# Patient Record
Sex: FEMALE | Race: WHITE | NOT HISPANIC OR LATINO | Employment: FULL TIME | URBAN - METROPOLITAN AREA
[De-identification: names, ages, dates, MRNs, and addresses within clinical notes are randomized per-mention and may not be internally consistent; named-entity substitution may affect disease eponyms.]

---

## 2021-09-03 ENCOUNTER — LAB (OUTPATIENT)
Dept: LAB | Facility: HOSPITAL | Age: 31
End: 2021-09-03

## 2021-09-05 ENCOUNTER — APPOINTMENT (OUTPATIENT)
Dept: PREADMISSION TESTING | Facility: HOSPITAL | Age: 31
End: 2021-09-05

## 2021-09-05 LAB — SARS-COV-2 RNA PNL SPEC NAA+PROBE: NOT DETECTED

## 2021-09-05 PROCEDURE — C9803 HOPD COVID-19 SPEC COLLECT: HCPCS

## 2021-09-05 PROCEDURE — U0004 COV-19 TEST NON-CDC HGH THRU: HCPCS

## 2021-09-07 ENCOUNTER — LAB REQUISITION (OUTPATIENT)
Dept: LAB | Facility: HOSPITAL | Age: 31
End: 2021-09-07

## 2021-09-07 DIAGNOSIS — O02.1 MISSED ABORTION: ICD-10-CM

## 2021-09-07 PROCEDURE — 88305 TISSUE EXAM BY PATHOLOGIST: CPT | Performed by: OBSTETRICS & GYNECOLOGY

## 2021-09-08 LAB
CYTO UR: NORMAL
LAB AP CASE REPORT: NORMAL
LAB AP CLINICAL INFORMATION: NORMAL
PATH REPORT.FINAL DX SPEC: NORMAL
PATH REPORT.GROSS SPEC: NORMAL

## 2021-09-10 PROCEDURE — U0004 COV-19 TEST NON-CDC HGH THRU: HCPCS | Performed by: FAMILY MEDICINE

## 2022-07-08 DIAGNOSIS — Z3A.39 39 WEEKS GESTATION OF PREGNANCY: Primary | ICD-10-CM

## 2022-07-15 ENCOUNTER — HOSPITAL ENCOUNTER (INPATIENT)
Facility: HOSPITAL | Age: 32
LOS: 3 days | Discharge: HOME | End: 2022-07-18
Attending: ADVANCED PRACTICE MIDWIFE | Admitting: OBSTETRICS & GYNECOLOGY

## 2022-07-15 ENCOUNTER — ANESTHESIA (OUTPATIENT)
Dept: LABOR AND DELIVERY | Facility: HOSPITAL | Age: 32
End: 2022-07-15

## 2022-07-15 ENCOUNTER — ANESTHESIA EVENT (OUTPATIENT)
Dept: LABOR AND DELIVERY | Facility: HOSPITAL | Age: 32
End: 2022-07-15

## 2022-07-15 PROBLEM — Z37.9 NORMAL LABOR: Status: ACTIVE | Noted: 2022-07-15

## 2022-07-15 LAB
ABO GROUP BLD: NORMAL
ABO GROUP BLD: NORMAL
ALP SERPL-CCNC: 282 U/L (ref 39–117)
ALT SERPL W P-5'-P-CCNC: 9 U/L (ref 1–33)
AST SERPL-CCNC: 24 U/L (ref 1–32)
BILIRUB SERPL-MCNC: 0.2 MG/DL (ref 0–1.2)
BLD GP AB SCN SERPL QL: NEGATIVE
CREAT SERPL-MCNC: 0.74 MG/DL (ref 0.57–1)
DEPRECATED RDW RBC AUTO: 43.9 FL (ref 37–54)
ERYTHROCYTE [DISTWIDTH] IN BLOOD BY AUTOMATED COUNT: 13.4 % (ref 12.3–15.4)
HCT VFR BLD AUTO: 36.1 % (ref 34–46.6)
HGB BLD-MCNC: 12.4 G/DL (ref 12–15.9)
LDH SERPL-CCNC: 254 U/L (ref 135–214)
MCH RBC QN AUTO: 30.8 PG (ref 26.6–33)
MCHC RBC AUTO-ENTMCNC: 34.3 G/DL (ref 31.5–35.7)
MCV RBC AUTO: 89.8 FL (ref 79–97)
PLATELET # BLD AUTO: 212 10*3/MM3 (ref 140–450)
PMV BLD AUTO: 12.5 FL (ref 6–12)
RBC # BLD AUTO: 4.02 10*6/MM3 (ref 3.77–5.28)
RH BLD: NEGATIVE
RH BLD: NEGATIVE
SARS-COV-2 RDRP RESP QL NAA+PROBE: NORMAL
T&S EXPIRATION DATE: NORMAL
URATE SERPL-MCNC: 4.5 MG/DL (ref 2.4–5.7)
WBC NRBC COR # BLD: 13.99 10*3/MM3 (ref 3.4–10.8)

## 2022-07-15 PROCEDURE — 86901 BLOOD TYPING SEROLOGIC RH(D): CPT

## 2022-07-15 PROCEDURE — 82247 BILIRUBIN TOTAL: CPT | Performed by: OBSTETRICS & GYNECOLOGY

## 2022-07-15 PROCEDURE — 25010000002 ROPIVACAINE PER 1 MG: Performed by: ANESTHESIOLOGY

## 2022-07-15 PROCEDURE — 85027 COMPLETE CBC AUTOMATED: CPT | Performed by: OBSTETRICS & GYNECOLOGY

## 2022-07-15 PROCEDURE — C1755 CATHETER, INTRASPINAL: HCPCS | Performed by: ANESTHESIOLOGY

## 2022-07-15 PROCEDURE — 84075 ASSAY ALKALINE PHOSPHATASE: CPT | Performed by: OBSTETRICS & GYNECOLOGY

## 2022-07-15 PROCEDURE — 86850 RBC ANTIBODY SCREEN: CPT | Performed by: OBSTETRICS & GYNECOLOGY

## 2022-07-15 PROCEDURE — 84550 ASSAY OF BLOOD/URIC ACID: CPT | Performed by: OBSTETRICS & GYNECOLOGY

## 2022-07-15 PROCEDURE — 84450 TRANSFERASE (AST) (SGOT): CPT | Performed by: OBSTETRICS & GYNECOLOGY

## 2022-07-15 PROCEDURE — 84460 ALANINE AMINO (ALT) (SGPT): CPT | Performed by: OBSTETRICS & GYNECOLOGY

## 2022-07-15 PROCEDURE — C1755 CATHETER, INTRASPINAL: HCPCS

## 2022-07-15 PROCEDURE — 86900 BLOOD TYPING SEROLOGIC ABO: CPT | Performed by: OBSTETRICS & GYNECOLOGY

## 2022-07-15 PROCEDURE — 51703 INSERT BLADDER CATH COMPLEX: CPT

## 2022-07-15 PROCEDURE — 86901 BLOOD TYPING SEROLOGIC RH(D): CPT | Performed by: OBSTETRICS & GYNECOLOGY

## 2022-07-15 PROCEDURE — 83615 LACTATE (LD) (LDH) ENZYME: CPT | Performed by: OBSTETRICS & GYNECOLOGY

## 2022-07-15 PROCEDURE — 86900 BLOOD TYPING SEROLOGIC ABO: CPT

## 2022-07-15 PROCEDURE — 87635 SARS-COV-2 COVID-19 AMP PRB: CPT | Performed by: OBSTETRICS & GYNECOLOGY

## 2022-07-15 PROCEDURE — 25010000002 FENTANYL CITRATE (PF) 50 MCG/ML SOLUTION: Performed by: ANESTHESIOLOGY

## 2022-07-15 PROCEDURE — 59025 FETAL NON-STRESS TEST: CPT

## 2022-07-15 PROCEDURE — 82565 ASSAY OF CREATININE: CPT | Performed by: OBSTETRICS & GYNECOLOGY

## 2022-07-15 RX ORDER — ONDANSETRON 2 MG/ML
4 INJECTION INTRAMUSCULAR; INTRAVENOUS EVERY 6 HOURS PRN
Status: DISCONTINUED | OUTPATIENT
Start: 2022-07-15 | End: 2022-07-17

## 2022-07-15 RX ORDER — FAMOTIDINE 10 MG/ML
20 INJECTION, SOLUTION INTRAVENOUS ONCE AS NEEDED
Status: DISCONTINUED | OUTPATIENT
Start: 2022-07-15 | End: 2022-07-16 | Stop reason: HOSPADM

## 2022-07-15 RX ORDER — SODIUM CHLORIDE 0.9 % (FLUSH) 0.9 %
10 SYRINGE (ML) INJECTION EVERY 12 HOURS SCHEDULED
Status: DISCONTINUED | OUTPATIENT
Start: 2022-07-15 | End: 2022-07-17

## 2022-07-15 RX ORDER — MAGNESIUM CARB/ALUMINUM HYDROX 105-160MG
30 TABLET,CHEWABLE ORAL ONCE
Status: DISCONTINUED | OUTPATIENT
Start: 2022-07-15 | End: 2022-07-17

## 2022-07-15 RX ORDER — TRISODIUM CITRATE DIHYDRATE AND CITRIC ACID MONOHYDRATE 500; 334 MG/5ML; MG/5ML
30 SOLUTION ORAL ONCE
Status: DISCONTINUED | OUTPATIENT
Start: 2022-07-15 | End: 2022-07-16 | Stop reason: HOSPADM

## 2022-07-15 RX ORDER — LIDOCAINE HYDROCHLORIDE AND EPINEPHRINE 15; 5 MG/ML; UG/ML
INJECTION, SOLUTION EPIDURAL AS NEEDED
Status: DISCONTINUED | OUTPATIENT
Start: 2022-07-15 | End: 2022-07-16 | Stop reason: SURG

## 2022-07-15 RX ORDER — METOCLOPRAMIDE HYDROCHLORIDE 5 MG/ML
10 INJECTION INTRAMUSCULAR; INTRAVENOUS ONCE AS NEEDED
Status: DISCONTINUED | OUTPATIENT
Start: 2022-07-15 | End: 2022-07-16 | Stop reason: HOSPADM

## 2022-07-15 RX ORDER — SODIUM CHLORIDE 0.9 % (FLUSH) 0.9 %
10 SYRINGE (ML) INJECTION AS NEEDED
Status: DISCONTINUED | OUTPATIENT
Start: 2022-07-15 | End: 2022-07-17

## 2022-07-15 RX ORDER — ROPIVACAINE HYDROCHLORIDE 2 MG/ML
15 INJECTION, SOLUTION EPIDURAL; INFILTRATION; PERINEURAL CONTINUOUS
Status: DISCONTINUED | OUTPATIENT
Start: 2022-07-15 | End: 2022-07-17

## 2022-07-15 RX ORDER — OXYTOCIN/0.9 % SODIUM CHLORIDE 30/500 ML
85 PLASTIC BAG, INJECTION (ML) INTRAVENOUS ONCE
Status: COMPLETED | OUTPATIENT
Start: 2022-07-15 | End: 2022-07-16

## 2022-07-15 RX ORDER — MISOPROSTOL 200 UG/1
800 TABLET ORAL ONCE AS NEEDED
Status: DISCONTINUED | OUTPATIENT
Start: 2022-07-15 | End: 2022-07-17

## 2022-07-15 RX ORDER — DIPHENHYDRAMINE HYDROCHLORIDE 50 MG/ML
12.5 INJECTION INTRAMUSCULAR; INTRAVENOUS EVERY 8 HOURS PRN
Status: DISCONTINUED | OUTPATIENT
Start: 2022-07-15 | End: 2022-07-16 | Stop reason: HOSPADM

## 2022-07-15 RX ORDER — OXYTOCIN/0.9 % SODIUM CHLORIDE 30/500 ML
650 PLASTIC BAG, INJECTION (ML) INTRAVENOUS ONCE
Status: DISCONTINUED | OUTPATIENT
Start: 2022-07-15 | End: 2022-07-17

## 2022-07-15 RX ORDER — ONDANSETRON 4 MG/1
4 TABLET, FILM COATED ORAL EVERY 6 HOURS PRN
Status: DISCONTINUED | OUTPATIENT
Start: 2022-07-15 | End: 2022-07-17

## 2022-07-15 RX ORDER — FENTANYL CITRATE 50 UG/ML
INJECTION, SOLUTION INTRAMUSCULAR; INTRAVENOUS AS NEEDED
Status: DISCONTINUED | OUTPATIENT
Start: 2022-07-15 | End: 2022-07-16 | Stop reason: SURG

## 2022-07-15 RX ORDER — SODIUM CHLORIDE, SODIUM LACTATE, POTASSIUM CHLORIDE, CALCIUM CHLORIDE 600; 310; 30; 20 MG/100ML; MG/100ML; MG/100ML; MG/100ML
125 INJECTION, SOLUTION INTRAVENOUS CONTINUOUS
Status: DISCONTINUED | OUTPATIENT
Start: 2022-07-15 | End: 2022-07-17

## 2022-07-15 RX ORDER — CARBOPROST TROMETHAMINE 250 UG/ML
250 INJECTION, SOLUTION INTRAMUSCULAR
Status: DISCONTINUED | OUTPATIENT
Start: 2022-07-15 | End: 2022-07-17

## 2022-07-15 RX ORDER — PRENATAL WITH FERROUS FUM AND FOLIC ACID 3080; 920; 120; 400; 22; 1.84; 3; 20; 10; 1; 12; 200; 27; 25; 2 [IU]/1; [IU]/1; MG/1; [IU]/1; MG/1; MG/1; MG/1; MG/1; MG/1; MG/1; UG/1; MG/1; MG/1; MG/1; MG/1
1 TABLET ORAL DAILY
COMMUNITY

## 2022-07-15 RX ORDER — LIDOCAINE HYDROCHLORIDE 10 MG/ML
5 INJECTION, SOLUTION EPIDURAL; INFILTRATION; INTRACAUDAL; PERINEURAL AS NEEDED
Status: DISCONTINUED | OUTPATIENT
Start: 2022-07-15 | End: 2022-07-17

## 2022-07-15 RX ORDER — ONDANSETRON 2 MG/ML
4 INJECTION INTRAMUSCULAR; INTRAVENOUS ONCE AS NEEDED
Status: DISCONTINUED | OUTPATIENT
Start: 2022-07-15 | End: 2022-07-16 | Stop reason: HOSPADM

## 2022-07-15 RX ORDER — ROPIVACAINE HYDROCHLORIDE 5 MG/ML
INJECTION, SOLUTION EPIDURAL; INFILTRATION; PERINEURAL AS NEEDED
Status: DISCONTINUED | OUTPATIENT
Start: 2022-07-15 | End: 2022-07-16 | Stop reason: SURG

## 2022-07-15 RX ORDER — METHYLERGONOVINE MALEATE 0.2 MG/ML
200 INJECTION INTRAVENOUS ONCE AS NEEDED
Status: DISCONTINUED | OUTPATIENT
Start: 2022-07-15 | End: 2022-07-17

## 2022-07-15 RX ORDER — EPHEDRINE SULFATE 5 MG/ML
10 INJECTION INTRAVENOUS
Status: DISCONTINUED | OUTPATIENT
Start: 2022-07-15 | End: 2022-07-16 | Stop reason: HOSPADM

## 2022-07-15 RX ADMIN — LIDOCAINE HYDROCHLORIDE AND EPINEPHRINE 3 ML: 15; 5 INJECTION, SOLUTION EPIDURAL at 19:01

## 2022-07-15 RX ADMIN — LIDOCAINE HYDROCHLORIDE AND EPINEPHRINE 2 ML: 15; 5 INJECTION, SOLUTION EPIDURAL at 19:03

## 2022-07-15 RX ADMIN — ROPIVACAINE HYDROCHLORIDE 6 ML: 5 INJECTION, SOLUTION EPIDURAL; INFILTRATION; PERINEURAL at 19:05

## 2022-07-15 RX ADMIN — SODIUM CHLORIDE, POTASSIUM CHLORIDE, SODIUM LACTATE AND CALCIUM CHLORIDE 125 ML/HR: 600; 310; 30; 20 INJECTION, SOLUTION INTRAVENOUS at 19:56

## 2022-07-15 RX ADMIN — FENTANYL CITRATE 100 MCG: 50 INJECTION, SOLUTION INTRAMUSCULAR; INTRAVENOUS at 19:05

## 2022-07-15 RX ADMIN — ROPIVACAINE HYDROCHLORIDE 15 ML/HR: 2 INJECTION, SOLUTION EPIDURAL; INFILTRATION at 19:08

## 2022-07-15 RX ADMIN — SODIUM CHLORIDE, POTASSIUM CHLORIDE, SODIUM LACTATE AND CALCIUM CHLORIDE 1000 ML: 600; 310; 30; 20 INJECTION, SOLUTION INTRAVENOUS at 18:24

## 2022-07-15 NOTE — ANESTHESIA PROCEDURE NOTES
Labor Epidural      Patient reassessed immediately prior to procedure    Patient location during procedure: OB  Performed By  Anesthesiologist: Thelma Petty DO  Preanesthetic Checklist  Completed: patient identified, IV checked, risks and benefits discussed, surgical consent, monitors and equipment checked, pre-op evaluation and timeout performed  Additional Notes  CSE performed using 25g Jayy  Prep:  Pt Position:sitting  Sterile Tech:cap, gloves, mask and sterile barrier  Prep:chlorhexidine gluconate and isopropyl alcohol  Monitoring:blood pressure monitoring  Epidural Block Procedure:  Approach:midline  Guidance:palpation technique  Location:L3-L4  Needle Type:Tuohy  Needle Gauge:17 G  Loss of Resistance Medium: air  Loss of Resistance: 5cm  Cath Depth at skin:12 cm  Paresthesia: none  Aspiration:negative  Test Dose:negative  Number of Attempts: 1  Post Assessment:  Dressing:occlusive dressing applied and secured with tape  Pt Tolerance:patient tolerated the procedure well with no apparent complications  Complications:no

## 2022-07-15 NOTE — ANESTHESIA PREPROCEDURE EVALUATION
Anesthesia Evaluation     Patient summary reviewed and Nursing notes reviewed   NPO Solid Status: > 6 hours             Airway   Dental      Pulmonary - negative pulmonary ROS   Cardiovascular - negative cardio ROS        Neuro/Psych- negative ROS  GI/Hepatic/Renal/Endo    (+) obesity, morbid obesity,      Musculoskeletal (-) negative ROS    Abdominal    Substance History - negative use     OB/GYN    (+) Pregnant,         Other - negative ROS                       Anesthesia Plan    ASA 2     epidural       Anesthetic plan, risks, benefits, and alternatives have been provided, discussed and informed consent has been obtained with: patient.        CODE STATUS:

## 2022-07-15 NOTE — H&P
Sarah  Obstetric History and Physical    Chief Complaint   Patient presents with   • Contractions       Subjective     Patient is a 32 y.o. female  currently at 39w3d, who presents with contraction. On arrival she was 5-6 cm.  She is now comfortable after epidural.  Baby active.     Her prenatal care is benign.  Her previous obstetric/gynecological history is noted for previous SAB x1.    The following portions of the patients history were reviewed and updated as appropriate: current medications, allergies, past medical history, past surgical history, past family history, past social history and problem list .       Prenatal Information:            Pregnancy Outside Results - Transcribed From Office Records - See Scanned Records For Details     Test Value Date Time    ABO  O  07/15/22 184    Rh  Negative  07/15/22 1845    Antibody Screen  Negative  07/15/22 180    Varicella IgG       Rubella       Hgb  12.4 g/dL 07/15/22 1825    Hct  36.1 % 07/15/22 1825    Glucose Fasting GTT       Glucose Tolerance Test 1 hour       Glucose Tolerance Test 3 hour       Gonorrhea (discrete)       Chlamydia (discrete)       RPR       VDRL       Syphilis Antibody       HBsAg       Herpes Simplex Virus PCR       Herpes Simplex VIrus Culture       HIV       Hep C RNA Quant PCR       Hep C Antibody       AFP       Group B Strep       GBS Susceptibility to Clindamycin       GBS Susceptibility to Erythromycin       Fetal Fibronectin       Genetic Testing, Maternal Blood             Drug Screening     Test Value Date Time    Urine Drug Screen       Amphetamine Screen       Barbiturate Screen       Benzodiazepine Screen       Methadone Screen       Phencyclidine Screen       Opiates Screen       THC Screen       Cocaine Screen       Propoxyphene Screen       Buprenorphine Screen       Methamphetamine Screen       Oxycodone Screen       Tricyclic Antidepressants Screen             Legend    ^: Historical                           Past OB History:       OB History    Para Term  AB Living   2 0 0 0 1 0   SAB IAB Ectopic Molar Multiple Live Births   0 0 0 0 0 0      # Outcome Date GA Lbr Amado/2nd Weight Sex Delivery Anes PTL Lv   2 Current            1 AB 2021 8w0d              Past Medical History: Past Medical History:   Diagnosis Date   • Anxiety    • AVM (arteriovenous malformation)     right kidney   • Depression    • Kidney disorder    • Ovarian cyst       Past Surgical History Past Surgical History:   Procedure Laterality Date   • D & C WITH SUCTION     • EMBOLIZATION TUMOR      right kidney   • MOUTH SURGERY      soft tissue graft   • TONSILLECTOMY     • URETERAL STENT INSERTION        Family History: No family history on file.   Social History:  reports that she has never smoked. She has never used smokeless tobacco.   reports previous alcohol use.   reports no history of drug use.        General ROS: Pertinent items are noted in HPI, all other systems reviewed and negative    Objective     Vitals:    07/15/22 1912   BP: 119/58   Pulse: 112   Resp: 16   Temp: 97.6 °F (36.4 °C)     Weight: Weight:  [97.5 kg (215 lb)] 97.5 kg (215 lb)     Physical Examination:   General Appearance: alert, well appearing, in no apparent distress  Lungs: clear to auscultation, no wheezes, rales or rhonchi, symmetric air entry  Heart: regular rate and rhythm, no murmurs  Abdomen: Gravid uterus, soft non tender, + FM  Back exam: no CVA tenderness   Extremities: no redness or tenderness in the calves or thighs, no edema  Skin: normal coloration and turgor, no rashes      Presentation: Vertex   Cervix: Exam by: Method: sterile exam per CNM (Hernandez)   Dilation: Cervical Dilation (cm): 7-8   Effacement: Cervical Effacement: %   Station:  Membranes: Fetal Station: -1   AROM fluid clear        Fetal Heart Rate Assessment   Method: Fetal HR Assessment Method: external   Beats/min: Fetal HR (beats/min): 135   Baseline: Fetal HR Baseline:  normal range   Varibility: Fetal HR Variability: moderate (amplitude range 6 to 25 bpm)   Accels: Fetal HR Accelerations: greater than/equal to 15 bpm   Decels: Fetal HR Decelerations: absent   Tracing Category:       Uterine Assessment   Method: Method: external tocotransducer   Frequency (min): Contraction Frequency (Minutes): 4-5   Ctx Count in 10 min: Contractions in 10 Minutes: 1   Duration:     Intensity:     Intensity by IUPC:     Resting Tone:     Resting Tone by IUPC:     Allen Units:       Laboratory Results: Labs reviewed        Normal labor        Assessment:  1.  Intrauterine pregnancy at 39w3d weeks gestation with reactive, reassuring fetal status.    2.  labor  with ROM  3.  GBS status: Negative    Plan:  1. fetal and uterine monitoring  continuously, expectant management, labor augmentation  Via amniotomy and analgesia with  epidural  2. Plan of care has been reviewed with patient and partner  3.  Risks, benefits of treatment plan have been discussed.  4.  All questions have been answered.        Alissa Ng CNM  7/15/2022  19:49 EDT

## 2022-07-16 PROBLEM — Z37.9 NORMAL LABOR: Status: RESOLVED | Noted: 2022-07-15 | Resolved: 2022-07-16

## 2022-07-16 RX ORDER — PRENATAL VIT/IRON FUM/FOLIC AC 27MG-0.8MG
1 TABLET ORAL DAILY
Status: DISCONTINUED | OUTPATIENT
Start: 2022-07-16 | End: 2022-07-18 | Stop reason: HOSPADM

## 2022-07-16 RX ORDER — IBUPROFEN 600 MG/1
600 TABLET ORAL EVERY 6 HOURS PRN
Status: DISCONTINUED | OUTPATIENT
Start: 2022-07-16 | End: 2022-07-18 | Stop reason: HOSPADM

## 2022-07-16 RX ORDER — ACETAMINOPHEN 325 MG/1
650 TABLET ORAL EVERY 4 HOURS PRN
Status: DISCONTINUED | OUTPATIENT
Start: 2022-07-16 | End: 2022-07-18 | Stop reason: HOSPADM

## 2022-07-16 RX ORDER — ERYTHROMYCIN 5 MG/G
OINTMENT OPHTHALMIC
Status: DISCONTINUED
Start: 2022-07-16 | End: 2022-07-18 | Stop reason: HOSPADM

## 2022-07-16 RX ORDER — DOCUSATE SODIUM 100 MG/1
100 CAPSULE, LIQUID FILLED ORAL 2 TIMES DAILY
Status: DISCONTINUED | OUTPATIENT
Start: 2022-07-16 | End: 2022-07-18 | Stop reason: HOSPADM

## 2022-07-16 RX ADMIN — WITCH HAZEL 1 PAD: 500 SOLUTION RECTAL; TOPICAL at 06:37

## 2022-07-16 RX ADMIN — IBUPROFEN 600 MG: 600 TABLET, FILM COATED ORAL at 04:37

## 2022-07-16 RX ADMIN — Medication 85 ML/HR: at 03:36

## 2022-07-16 RX ADMIN — DOCUSATE SODIUM 100 MG: 100 CAPSULE, LIQUID FILLED ORAL at 20:22

## 2022-07-16 RX ADMIN — Medication 1 APPLICATION: at 06:37

## 2022-07-16 RX ADMIN — IBUPROFEN 600 MG: 600 TABLET, FILM COATED ORAL at 15:53

## 2022-07-16 RX ADMIN — DOCUSATE SODIUM 100 MG: 100 CAPSULE, LIQUID FILLED ORAL at 08:23

## 2022-07-16 RX ADMIN — Medication: at 06:37

## 2022-07-16 RX ADMIN — PRENATAL VITAMINS-IRON FUMARATE 27 MG IRON-FOLIC ACID 0.8 MG TABLET 1 TABLET: at 08:23

## 2022-07-16 NOTE — LACTATION NOTE
07/16/22 1000   Maternal Information   Date of Referral 07/16/22   Person Making Referral lactation consultant  (New mother/baby couplet)   Maternal Reason for Referral no prior breastfeeding experience   Maternal Assessment   Breast Size Issue none   Breast Shape Bilateral:;round   Breast Density Bilateral:;soft   Nipples Bilateral:;short   Left Nipple Symptoms intact;nontender   Right Nipple Symptoms intact;nontender   Maternal Infant Feeding   Maternal Emotional State relaxed;receptive   Infant Positioning clutch/football;cross-cradle  (Tried multiple holds, but baby can't get comfortable nursing on the left side.  He did okay at the right side, but is a little sluggish.)   Signs of Milk Transfer none noted  (Baby sucks sluggishly.)   Pain with Feeding no   Comfort Measures Before/During Feeding infant position adjusted;maternal position adjusted   Latch Assistance minimal assistance  (Full assistance needed on left side.  Mom does okay with right side.)   Support Person Involvement actively supporting mother   Milk Expression/Equipment   Breast Pump Type double electric, personal  (Mom has a Zomee Pump.)   Breast Pumping   Breast Pumping Interventions post-feed pumping encouraged

## 2022-07-16 NOTE — ANESTHESIA POSTPROCEDURE EVALUATION
Patient: Tania Weinstein Iancu    Procedure Summary     Date: 07/15/22 Room / Location:     Anesthesia Start: 1855 Anesthesia Stop: 07/16/22 0227    Procedure: LABOR ANALGESIA Diagnosis:     Scheduled Providers:  Provider: Thelma Petty DO    Anesthesia Type: epidural ASA Status: 2          Anesthesia Type: epidural    Vitals  Vitals Value Taken Time   /57 07/16/22 0915   Temp 98.3 °F (36.8 °C) 07/16/22 0915   Pulse 95 07/16/22 0915   Resp 18 07/16/22 0915   SpO2             Post Anesthesia Care and Evaluation    Patient location during evaluation: bedside  Patient participation: complete - patient participated  Level of consciousness: awake  Pain score: 0  Pain management: satisfactory to patient    Airway patency: patent  Anesthetic complications: No anesthetic complications  PONV Status: none  Cardiovascular status: acceptable and hemodynamically stable  Respiratory status: acceptable  Hydration status: acceptable  Post Neuraxial Block status: Motor and sensory function returned to baseline and No signs or symptoms of PDPH

## 2022-07-16 NOTE — PROGRESS NOTES
[facility]  Vaginal Delivery Progress Note    Subjective     Doing well, pain controlled, lochia less than menses, voiding without difficulty      Objective     Vital Signs Range for the last 24 hours  Temperature: Temp:  [97.6 °F (36.4 °C)-98.5 °F (36.9 °C)] 98.3 °F (36.8 °C)   Temp Source: Temp src: Oral   BP: BP: ()/(51-85) 129/57   Pulse: Heart Rate:  [] 95   Respirations: Resp:  [16-18] 18   SPO2:     O2 Amount (l/min):     O2 Devices           Physical Exam:  General:  no acute distress.  Abdomen: Soft, non-tender, fundus firm  Lochia: less than a normal period,  Perineum: is normal  Extremities: normal, atraumatic, no cyanosis, and trace edema.       Lab results reviewed:  Yes    Lab Results   Component Value Date    WBC 13.99 (H) 07/15/2022    HGB 12.4 07/15/2022    HCT 36.1 07/15/2022    MCV 89.8 07/15/2022     07/15/2022         Assessment & Plan       Normal spontaneous vaginal delivery      Tania Banda is Day 0  post-partum       Plan:  Continue current care.      Alissa Ng CNM  7/16/2022  13:35 EDT

## 2022-07-16 NOTE — L&D DELIVERY NOTE
[facility]  Vaginal Delivery Note   Review the Delivery Report for details.       Delivery     Delivery: Vaginal, Spontaneous     YOB: 2021    Time of Birth:  Gestational Age 2:21 AM   39w4d     Anesthesia: Epidural     Delivering clinician: Alissa Ng CNM   Forceps?   No   Vacuum? No    Shoulder dystocia present: No        Delivery narrative:  Tania pushed well to a spontaneous vaginal delivery of an viable male infant in OA position over intact perineum.  Mouth and nose bulb suctioned after delivery of head. Loose nuchal cord x 1 reduced. Shoulders and body delivered easily atraumatically. Vigorous infant placed on mothers abdomen and dried.  Cord was allowed to cease pulsation, double clamped, cut by FOB. Cord blood and cord segment obtained.  Spontaneous delivery of intact placenta with 3 vessel cord. Fundus boggy , massaged and clots evacuated until uterus firm, lochia moderate rubra.  Perineal and vaginal inspection revealed no laceration.  Mother and son stable in the immediate PP period.     Infant    Findings: male  infant     Infant observations: Weight: 3306 g (7 lb 4.6 oz)   Length: 21.25  in  Observations/Comments:        Apgars: 7  @ 1 minute /    9  @ 5 minutes   Infant Name: Kvng     Placenta, Cord, and Fluid    Placenta delivered  Spontaneous  at   7/16/2022  2:27 AM     Cord: 3 vessels  present.   Nuchal Cord?  yes; Number of nuchal loops present:  1    Cord blood obtained: Yes    Cord gases obtained:  No    Cord gas results: Venous:  No results found for: PHCVEN    Arterial:  No results found for: PHCART     Repair    Episiotomy: None     No    Lacerations: No   Estimated Blood Loss: Est. Blood Loss (mL): 300 mL (Filed from Delivery Summary) (07/16/22 0602)             Quantitative Blood Loss:                  Complications  none    Disposition  Mother to Mother Baby/Postpartum  in stable condition currently.  Baby to remains with mom  in stable condition  currently.      Alissa Ng, ROHITH  07/16/22  02:47 EDT

## 2022-07-17 LAB
BASOPHILS # BLD AUTO: 0.05 10*3/MM3 (ref 0–0.2)
BASOPHILS NFR BLD AUTO: 0.3 % (ref 0–1.5)
DEPRECATED RDW RBC AUTO: 48 FL (ref 37–54)
EOSINOPHIL # BLD AUTO: 0.17 10*3/MM3 (ref 0–0.4)
EOSINOPHIL NFR BLD AUTO: 1.2 % (ref 0.3–6.2)
ERYTHROCYTE [DISTWIDTH] IN BLOOD BY AUTOMATED COUNT: 13.8 % (ref 12.3–15.4)
HCT VFR BLD AUTO: 31.2 % (ref 34–46.6)
HGB BLD-MCNC: 10.2 G/DL (ref 12–15.9)
IMM GRANULOCYTES # BLD AUTO: 0.2 10*3/MM3 (ref 0–0.05)
IMM GRANULOCYTES NFR BLD AUTO: 1.4 % (ref 0–0.5)
LYMPHOCYTES # BLD AUTO: 2.48 10*3/MM3 (ref 0.7–3.1)
LYMPHOCYTES NFR BLD AUTO: 17.1 % (ref 19.6–45.3)
MCH RBC QN AUTO: 31.2 PG (ref 26.6–33)
MCHC RBC AUTO-ENTMCNC: 32.7 G/DL (ref 31.5–35.7)
MCV RBC AUTO: 95.4 FL (ref 79–97)
MONOCYTES # BLD AUTO: 0.98 10*3/MM3 (ref 0.1–0.9)
MONOCYTES NFR BLD AUTO: 6.8 % (ref 5–12)
NEUTROPHILS NFR BLD AUTO: 10.61 10*3/MM3 (ref 1.7–7)
NEUTROPHILS NFR BLD AUTO: 73.2 % (ref 42.7–76)
NRBC BLD AUTO-RTO: 0 /100 WBC (ref 0–0.2)
PLATELET # BLD AUTO: 204 10*3/MM3 (ref 140–450)
PMV BLD AUTO: 11.8 FL (ref 6–12)
RBC # BLD AUTO: 3.27 10*6/MM3 (ref 3.77–5.28)
WBC NRBC COR # BLD: 14.49 10*3/MM3 (ref 3.4–10.8)

## 2022-07-17 PROCEDURE — 85025 COMPLETE CBC W/AUTO DIFF WBC: CPT | Performed by: ADVANCED PRACTICE MIDWIFE

## 2022-07-17 RX ORDER — FERROUS SULFATE 325(65) MG
325 TABLET ORAL 2 TIMES DAILY WITH MEALS
Status: DISCONTINUED | OUTPATIENT
Start: 2022-07-17 | End: 2022-07-18 | Stop reason: HOSPADM

## 2022-07-17 RX ADMIN — IBUPROFEN 600 MG: 600 TABLET, FILM COATED ORAL at 23:10

## 2022-07-17 RX ADMIN — FERROUS SULFATE TAB 325 MG (65 MG ELEMENTAL FE) 325 MG: 325 (65 FE) TAB at 20:16

## 2022-07-17 RX ADMIN — DOCUSATE SODIUM 100 MG: 100 CAPSULE, LIQUID FILLED ORAL at 11:36

## 2022-07-17 RX ADMIN — DOCUSATE SODIUM 100 MG: 100 CAPSULE, LIQUID FILLED ORAL at 20:17

## 2022-07-17 RX ADMIN — BENZOCAINE 1 APPLICATION: 5.6 OINTMENT TOPICAL at 12:06

## 2022-07-17 RX ADMIN — IBUPROFEN 600 MG: 600 TABLET, FILM COATED ORAL at 14:31

## 2022-07-17 RX ADMIN — PRENATAL VITAMINS-IRON FUMARATE 27 MG IRON-FOLIC ACID 0.8 MG TABLET 1 TABLET: at 11:35

## 2022-07-17 NOTE — LACTATION NOTE
Mom states baby is very sleepy and hard to keep active at the breast while nursing.  States baby latches well with a nipple shield but will not stay active at breast for long.  Encouraged mom to pump after nursing and syringe feed at the shield.

## 2022-07-17 NOTE — PROGRESS NOTES
[facility]  Vaginal Delivery Progress Note    Subjective     Doing well, pain controlled, lochia less than menses, voiding without difficulty      Objective     Vital Signs Range for the last 24 hours  Temperature: Temp:  [97.9 °F (36.6 °C)-98.6 °F (37 °C)] 98.4 °F (36.9 °C)   Temp Source: Temp src: Oral   BP: BP: (101-120)/(62-83) 119/64   Pulse: Heart Rate:  [] 89   Respirations: Resp:  [16-18] 16   SPO2:     O2 Amount (l/min):     O2 Devices           Physical Exam:  General:  no acute distresss.  Abdomen: Soft, non-tender, fundus firm  Lochia: less than a normal period,  Perineum: is normal  Extremities: normal, atraumatic, no cyanosis, and trace edema.       Lab results reviewed:  Yes    Lab Results   Component Value Date    WBC 14.49 (H) 07/17/2022    HGB 10.2 (L) 07/17/2022    HCT 31.2 (L) 07/17/2022    MCV 95.4 07/17/2022     07/17/2022         Assessment & Plan       Normal spontaneous vaginal delivery    Postpartum anemia      Tania Weinstein Iancu is Day 1  post-partum       Plan:  Continue current care. Start ferrous sulfate 325 mg BID      Alissa Ng CNM  7/17/2022  12:40 EDT

## 2022-07-18 ENCOUNTER — APPOINTMENT (OUTPATIENT)
Dept: PREADMISSION TESTING | Facility: HOSPITAL | Age: 32
End: 2022-07-18

## 2022-07-18 VITALS
TEMPERATURE: 98.4 F | HEIGHT: 63 IN | RESPIRATION RATE: 18 BRPM | BODY MASS INDEX: 38.09 KG/M2 | SYSTOLIC BLOOD PRESSURE: 109 MMHG | DIASTOLIC BLOOD PRESSURE: 78 MMHG | WEIGHT: 215 LBS | HEART RATE: 87 BPM

## 2022-07-18 RX ORDER — IBUPROFEN 600 MG/1
600 TABLET ORAL EVERY 6 HOURS PRN
Qty: 60 TABLET | Refills: 0 | Status: SHIPPED | OUTPATIENT
Start: 2022-07-18

## 2022-07-18 RX ORDER — PSEUDOEPHEDRINE HCL 30 MG
100 TABLET ORAL 2 TIMES DAILY PRN
Qty: 60 CAPSULE | Refills: 0 | Status: SHIPPED | OUTPATIENT
Start: 2022-07-18

## 2022-07-18 RX ORDER — FERROUS SULFATE 325(65) MG
325 TABLET ORAL
Qty: 30 TABLET | Refills: 0 | Status: SHIPPED | OUTPATIENT
Start: 2022-07-18

## 2022-07-18 RX ADMIN — IBUPROFEN 600 MG: 600 TABLET, FILM COATED ORAL at 07:01

## 2022-07-18 RX ADMIN — FERROUS SULFATE TAB 325 MG (65 MG ELEMENTAL FE) 325 MG: 325 (65 FE) TAB at 10:22

## 2022-07-18 RX ADMIN — DOCUSATE SODIUM 100 MG: 100 CAPSULE, LIQUID FILLED ORAL at 10:22

## 2022-07-18 RX ADMIN — PRENATAL VITAMINS-IRON FUMARATE 27 MG IRON-FOLIC ACID 0.8 MG TABLET 1 TABLET: at 10:22

## 2022-07-18 RX ADMIN — WITCH HAZEL 1 PAD: 500 SOLUTION RECTAL; TOPICAL at 16:13

## 2022-07-18 NOTE — PLAN OF CARE
Goal Outcome Evaluation:  Plan of Care Reviewed With: patient, spouse  D/C home today, following up with MARILU Dudley CNM, VSS, pt providing all self care        Progress: improving

## 2022-07-18 NOTE — DISCHARGE SUMMARY
[facility]  Delivery Discharge Summary    Primary OB Clinician:     EDC: Estimated Date of Delivery: 22    Gestational Age:39w4d    Date of Admission: 7/15/2022    Admission Diagnosis:      Discharge Diagnosis: Same    Date of Delivery: 2022   Time of Delivery: 2:21 AM     Delivered By:  Alissa Ng     Delivery Type: Vaginal, Spontaneous          Baby:male  infant;   Apgar:  7  @ 1 minute /   Apgar:  9  @ 5 minutes   Weight: 3306 g (7 lb 4.6 oz)    Length: 21.25     Anesthesia: Epidural      Laceration: No    Exam:  Normal postpartum exam    Hospital Course:  Hospital course has been stable.  Patient is tolerating po, voiding without difficulty and ready for discharge.  Please see medication reconciliation and lab report for additional details.      Follow Up:  Patient has been given a follow up appointment in our office.     Discharge Date: 2022; Discharge Time: 12:26 EDT    Meagan Mays CNM   12:26 EDT   22

## 2022-07-18 NOTE — PROGRESS NOTES
[facility]  Vaginal Delivery Progress Note    Subjective     PPD#2 . Feeling well. Tolerating regular diet. Voiding without difficulty. Pain controlled. Decreasing lochia. VSS. Afebrile. Mild pp anemia. Ready for discharge home.       Objective     Vital Signs Range for the last 24 hours  Temperature: Temp:  [98 °F (36.7 °C)-98.4 °F (36.9 °C)] 98.4 °F (36.9 °C)   Temp Source: Temp src: Oral   BP: BP: (104-110)/(61-78) 109/78   Pulse: Heart Rate:  [78-87] 87   Respirations: Resp:  [16-18] 18   SPO2:     O2 Amount (l/min):     O2 Devices           Physical Exam:  General:  no acute distresss.  Abdomen: Soft, non-tender, fundus firm  Extremities: normal, atraumatic, no cyanosis, and trace edema.       Lab results reviewed:  Yes    Lab Results   Component Value Date    WBC 14.49 (H) 2022    HGB 10.2 (L) 2022    HCT 31.2 (L) 2022    MCV 95.4 2022     2022         Assessment & Plan       Normal spontaneous vaginal delivery    Postpartum anemia      Tania Kayharman Iancu is Day 2  post-partum       Plan:  Discharge home with standard precautions and return to clinic in 4-6 weeks.      Meagan Mays CNM  2022  12:23 EDT

## 2022-07-19 NOTE — LACTATION NOTE
Questions for family were answered prior to discharge. PRN Lactation Consultant/Clinic contact encouraged.   07/18/22 1130   Maternal Information   Date of Referral 07/18/22   Person Making Referral lactation consultant  (follow up prior to discharge)   Maternal Reason for Referral no prior breastfeeding experience   Infant Reason for Referral hypoglycemia   Maternal Assessment   Breast Size Issue none   Breast Shape Bilateral:;round   Breast Density Bilateral:;soft   Nipples Bilateral:;short   Left Nipple Symptoms tender   Right Nipple Symptoms tender   Maternal Infant Feeding   Maternal Emotional State receptive;anxious   Infant Positioning cross-cradle  (left)   Signs of Milk Transfer other (see comments)  (few suckles, breastfeeding attempt as education for mother, after 30ml formula by bottle)   Pain with Feeding no  (per pt report)   Comfort Measures Before/During Feeding infant position adjusted;latch adjusted;maternal position adjusted;suction broken using finger  (Small nipple shield with proper placement education/demonstrated completed)   Comfort Measures Following Feeding other (see comments);breast cream/oil applied  (soft shells given)   Latch Assistance minimal assistance   Support Person Involvement actively supporting mother   Additional Documentation Breastfeeding Supplementation (Group)   Breastfeeding Supplementation   Person Feeding Infant father   Infant Indication for Supplementation hypoglycemia   Breastfeeding Supplementation Type expressed breast milk;formula   Method of Supplementation syringe;paced bottle  (education completed and father demonstrated)   Nipple Used For Supplementation extra slow flow  (Dr Humphreys's transition nipple provided)   Milk Expression/Equipment   Breast Pump Type double electric, personal  (zomee pump at bedside)   Breast Pumping   Breast Pumping Interventions post-feed pumping encouraged  (for short/missed feedings, if supplementation is required, or if  breastfeeding becomes too painful to encourage breastmilk production)

## 2023-07-27 LAB
EXTERNAL CHLAMYDIA SCREEN: NEGATIVE
EXTERNAL GONORRHEA SCREEN: NEGATIVE

## 2023-11-16 LAB
EXTERNAL ANTIBODY SCREEN: NEGATIVE
EXTERNAL GLUCOSE TOLERANCE TEST FASTING: 110 MG/DL

## 2023-12-10 ENCOUNTER — HOSPITAL ENCOUNTER (OUTPATIENT)
Facility: HOSPITAL | Age: 33
Discharge: HOME | End: 2023-12-10
Attending: ADVANCED PRACTICE MIDWIFE | Admitting: OBSTETRICS & GYNECOLOGY
Payer: COMMERCIAL

## 2023-12-10 VITALS
BODY MASS INDEX: 37.21 KG/M2 | TEMPERATURE: 97.6 F | DIASTOLIC BLOOD PRESSURE: 71 MMHG | SYSTOLIC BLOOD PRESSURE: 104 MMHG | HEART RATE: 98 BPM | HEIGHT: 63 IN | WEIGHT: 210 LBS | OXYGEN SATURATION: 95 % | RESPIRATION RATE: 18 BRPM

## 2023-12-10 PROBLEM — O36.8131 DECREASED FETAL MOVEMENT, THIRD TRIMESTER, FETUS 1: Status: ACTIVE | Noted: 2023-12-10

## 2023-12-10 PROBLEM — Z34.83 PRENATAL CARE, SUBSEQUENT PREGNANCY IN THIRD TRIMESTER: Status: ACTIVE | Noted: 2023-12-10

## 2023-12-10 PROBLEM — U07.1 COVID-19 AFFECTING PREGNANCY IN SECOND TRIMESTER: Status: ACTIVE | Noted: 2023-10-01

## 2023-12-10 PROBLEM — O98.512 COVID-19 AFFECTING PREGNANCY IN SECOND TRIMESTER: Status: ACTIVE | Noted: 2023-10-01

## 2023-12-10 PROCEDURE — 59025 FETAL NON-STRESS TEST: CPT | Performed by: OBSTETRICS & GYNECOLOGY

## 2023-12-10 PROCEDURE — 59025 FETAL NON-STRESS TEST: CPT

## 2023-12-10 PROCEDURE — 99214 OFFICE O/P EST MOD 30 MIN: CPT | Performed by: OBSTETRICS & GYNECOLOGY

## 2023-12-10 PROCEDURE — G0463 HOSPITAL OUTPT CLINIC VISIT: HCPCS

## 2023-12-10 RX ORDER — ASPIRIN 81 MG/1
81 TABLET ORAL DAILY
COMMUNITY

## 2023-12-10 NOTE — H&P
" [facility]  Tania Banda  : 1990  MRN: [id number]  CSN: [id number]    History and Physical    Subjective   Chief Complaint   Patient presents with    Decreased Fetal Movement     Tania Banda is a 33 y.o. year old  currently at 31w1d presenting with complaints of decreased fetal movement for the last day.  Since arrival fetal movement has picked up.  She is also had some concerns about food poisoning but is not having significant emesis or diarrhea.    Prenatal care has been with [facility].  It has been benign.  She did have COVID in the second trimester and has resolved fully.    OB History    Para Term  AB Living   3 1 1 0 1 1   SAB IAB Ectopic Molar Multiple Live Births   1 0 0 0 0 1      # Outcome Date GA Lbr Amado/2nd Weight Sex Delivery Anes PTL Lv   3 Current            2 Term 22 39w4d  3306 g (7 lb 4.6 oz) M Vag-Spont EPI N BORIS      Name: Kvng \"Cyril\"      Apgar1: 7  Apgar5: 9   1 SAB 2021 8w0d   M         Past Medical History:   Diagnosis Date    Anxiety and depression     AVM (arteriovenous malformation) of right kidney 2010    COVID-19 affecting pregnancy in second trimester 10/2023     Past Surgical History:   Procedure Laterality Date    WISDOM TOOTH EXTRACTION  2007    URETERAL STENT INSERTION Right 2009    EMBOLIZATION Right 2010    Right kidney AVM    EMBOLIZATION Right 2011    Right kidney AVM    TONSILLECTOMY  2016    GUM SURGERY  2017    D & C WITH SUCTION  2021     No current facility-administered medications for this encounter.    Allergies   Allergen Reactions    Sulfa Antibiotics Swelling     Severe facial swelling and rash     Social History    Tobacco Use      Smoking status: Never      Smokeless tobacco: Never    Review of Systems   Constitutional:  Negative for unexpected weight change.   Respiratory:  Negative for cough and shortness of breath.    Cardiovascular:  Negative for chest pain.   Gastrointestinal:  Negative for " "abdominal distention, constipation and diarrhea.        No persistent bloating   Genitourinary:  Negative for difficulty urinating, dysuria, hematuria and urgency.        No significant incontinence   Skin:  Negative for rash.   Neurological:  Negative for headaches.         Objective   /71   Pulse 98   Temp 97.6 °F (36.4 °C) (Oral)   Resp 18   Ht 160 cm (63\")   Wt 95.3 kg (210 lb)   SpO2 95%   BMI 37.20 kg/m²   General: well developed; well nourished  no acute distress   Abdomen: soft, non-tender; no masses  no umbilical or inguinal hernias are present  no hepato-splenomegaly   FHT's: reassuring and category 1      Cervix: was not checked.   Contractions: none   Back: CVA tenderness is absent bilateral      Prenatal Labs  N/a    Current Labs Reviewed   none       Assessment   IUP at 31w1d  Subjective decreased fetal movement with reactive nonstress test     Plan   Okay for discharge to home with scheduled follow-up with Marisela Chahal MD  12/10/2023  09:16 EST         Non Stress Test     [place]  Patient: Tania Banda  : 1990  MRN: [id number]  CSN: [id number]  Date: 12/10/2023    Estimated Date of Delivery: 2/10/24  Gestational Age: 31w1d    Indication for NST decreased fetal movement       Total Time on NST > 20 minutes       Interpretation    Baseline  beats per minute   Category 1   Decelerations Absent       Additional Comments none       This note has been electronically signed.    Ollie Chahal MD  12/10/2023  09:16 EST  "

## 2024-01-11 LAB — EXTERNAL GROUP B STREP ANTIGEN: NEGATIVE

## 2024-02-03 ENCOUNTER — ANESTHESIA EVENT (OUTPATIENT)
Dept: LABOR AND DELIVERY | Facility: HOSPITAL | Age: 34
End: 2024-02-03
Payer: COMMERCIAL

## 2024-02-03 ENCOUNTER — ANESTHESIA (OUTPATIENT)
Dept: LABOR AND DELIVERY | Facility: HOSPITAL | Age: 34
End: 2024-02-03
Payer: COMMERCIAL

## 2024-02-03 ENCOUNTER — HOSPITAL ENCOUNTER (INPATIENT)
Facility: HOSPITAL | Age: 34
LOS: 2 days | Discharge: HOME | End: 2024-02-05
Attending: ADVANCED PRACTICE MIDWIFE | Admitting: OBSTETRICS & GYNECOLOGY
Payer: COMMERCIAL

## 2024-02-03 PROBLEM — Z37.9 NORMAL LABOR: Status: ACTIVE | Noted: 2024-02-03

## 2024-02-03 PROBLEM — Z37.9 NORMAL LABOR: Status: RESOLVED | Noted: 2024-02-03 | Resolved: 2024-02-03

## 2024-02-03 LAB
ABO GROUP BLD: NORMAL
BLD GP AB SCN SERPL QL: POSITIVE
DEPRECATED RDW RBC AUTO: 44.8 FL (ref 37–54)
ERYTHROCYTE [DISTWIDTH] IN BLOOD BY AUTOMATED COUNT: 13 % (ref 12.3–15.4)
HCT VFR BLD AUTO: 35.2 % (ref 34–46.6)
HGB BLD-MCNC: 12.3 G/DL (ref 12–15.9)
MCH RBC QN AUTO: 33.1 PG (ref 26.6–33)
MCHC RBC AUTO-ENTMCNC: 34.9 G/DL (ref 31.5–35.7)
MCV RBC AUTO: 94.6 FL (ref 79–97)
PLATELET # BLD AUTO: 204 10*3/MM3 (ref 140–450)
PMV BLD AUTO: 11.7 FL (ref 6–12)
RBC # BLD AUTO: 3.72 10*6/MM3 (ref 3.77–5.28)
RESIDUAL RHIG DETECTED: NORMAL
RH BLD: NEGATIVE
T PALLIDUM IGG SER QL: NORMAL
T&S EXPIRATION DATE: NORMAL
WBC NRBC COR # BLD AUTO: 13.3 10*3/MM3 (ref 3.4–10.8)

## 2024-02-03 PROCEDURE — 25810000003 LACTATED RINGERS SOLUTION: Performed by: OBSTETRICS & GYNECOLOGY

## 2024-02-03 PROCEDURE — 86850 RBC ANTIBODY SCREEN: CPT | Performed by: OBSTETRICS & GYNECOLOGY

## 2024-02-03 PROCEDURE — 85027 COMPLETE CBC AUTOMATED: CPT | Performed by: OBSTETRICS & GYNECOLOGY

## 2024-02-03 PROCEDURE — C1755 CATHETER, INTRASPINAL: HCPCS | Performed by: ANESTHESIOLOGY

## 2024-02-03 PROCEDURE — S0260 H&P FOR SURGERY: HCPCS | Performed by: OBSTETRICS & GYNECOLOGY

## 2024-02-03 PROCEDURE — C1755 CATHETER, INTRASPINAL: HCPCS

## 2024-02-03 PROCEDURE — 51703 INSERT BLADDER CATH COMPLEX: CPT

## 2024-02-03 PROCEDURE — 25010000002 ROPIVACAINE PER 1 MG: Performed by: ANESTHESIOLOGY

## 2024-02-03 PROCEDURE — 59025 FETAL NON-STRESS TEST: CPT

## 2024-02-03 PROCEDURE — 86900 BLOOD TYPING SEROLOGIC ABO: CPT | Performed by: OBSTETRICS & GYNECOLOGY

## 2024-02-03 PROCEDURE — 86780 TREPONEMA PALLIDUM: CPT | Performed by: OBSTETRICS & GYNECOLOGY

## 2024-02-03 PROCEDURE — 10907ZC DRAINAGE OF AMNIOTIC FLUID, THERAPEUTIC FROM PRODUCTS OF CONCEPTION, VIA NATURAL OR ARTIFICIAL OPENING: ICD-10-PCS | Performed by: OBSTETRICS & GYNECOLOGY

## 2024-02-03 PROCEDURE — 0UQMXZZ REPAIR VULVA, EXTERNAL APPROACH: ICD-10-PCS | Performed by: OBSTETRICS & GYNECOLOGY

## 2024-02-03 PROCEDURE — 25010000002 FENTANYL CITRATE (PF) 50 MCG/ML SOLUTION: Performed by: ANESTHESIOLOGY

## 2024-02-03 PROCEDURE — 86870 RBC ANTIBODY IDENTIFICATION: CPT | Performed by: OBSTETRICS & GYNECOLOGY

## 2024-02-03 PROCEDURE — 86901 BLOOD TYPING SEROLOGIC RH(D): CPT | Performed by: OBSTETRICS & GYNECOLOGY

## 2024-02-03 RX ORDER — OXYTOCIN/0.9 % SODIUM CHLORIDE 30/500 ML
PLASTIC BAG, INJECTION (ML) INTRAVENOUS
Status: DISCONTINUED
Start: 2024-02-03 | End: 2024-02-05 | Stop reason: HOSPADM

## 2024-02-03 RX ORDER — OXYTOCIN/0.9 % SODIUM CHLORIDE 30/500 ML
999 PLASTIC BAG, INJECTION (ML) INTRAVENOUS ONCE
Status: COMPLETED | OUTPATIENT
Start: 2024-02-03 | End: 2024-02-03

## 2024-02-03 RX ORDER — SODIUM CHLORIDE 0.9 % (FLUSH) 0.9 %
1-10 SYRINGE (ML) INJECTION AS NEEDED
Status: DISCONTINUED | OUTPATIENT
Start: 2024-02-03 | End: 2024-02-05 | Stop reason: HOSPADM

## 2024-02-03 RX ORDER — SODIUM CHLORIDE 9 MG/ML
40 INJECTION, SOLUTION INTRAVENOUS AS NEEDED
Status: DISCONTINUED | OUTPATIENT
Start: 2024-02-03 | End: 2024-02-03 | Stop reason: HOSPADM

## 2024-02-03 RX ORDER — OXYTOCIN/0.9 % SODIUM CHLORIDE 30/500 ML
125 PLASTIC BAG, INJECTION (ML) INTRAVENOUS CONTINUOUS PRN
Status: DISCONTINUED | OUTPATIENT
Start: 2024-02-03 | End: 2024-02-05 | Stop reason: HOSPADM

## 2024-02-03 RX ORDER — HYDROCODONE BITARTRATE AND ACETAMINOPHEN 10; 325 MG/1; MG/1
1 TABLET ORAL EVERY 4 HOURS PRN
Status: DISCONTINUED | OUTPATIENT
Start: 2024-02-03 | End: 2024-02-05 | Stop reason: HOSPADM

## 2024-02-03 RX ORDER — ROPIVACAINE HYDROCHLORIDE 5 MG/ML
INJECTION, SOLUTION EPIDURAL; INFILTRATION; PERINEURAL AS NEEDED
Status: DISCONTINUED | OUTPATIENT
Start: 2024-02-03 | End: 2024-02-03 | Stop reason: SURG

## 2024-02-03 RX ORDER — FENTANYL CITRATE 50 UG/ML
INJECTION, SOLUTION INTRAMUSCULAR; INTRAVENOUS AS NEEDED
Status: DISCONTINUED | OUTPATIENT
Start: 2024-02-03 | End: 2024-02-03 | Stop reason: SURG

## 2024-02-03 RX ORDER — HYDROCORTISONE 25 MG/G
1 CREAM TOPICAL AS NEEDED
Status: DISCONTINUED | OUTPATIENT
Start: 2024-02-03 | End: 2024-02-05 | Stop reason: HOSPADM

## 2024-02-03 RX ORDER — METOCLOPRAMIDE HYDROCHLORIDE 5 MG/ML
10 INJECTION INTRAMUSCULAR; INTRAVENOUS ONCE AS NEEDED
Status: DISCONTINUED | OUTPATIENT
Start: 2024-02-03 | End: 2024-02-03 | Stop reason: HOSPADM

## 2024-02-03 RX ORDER — METHYLERGONOVINE MALEATE 0.2 MG/ML
200 INJECTION INTRAVENOUS ONCE AS NEEDED
Status: DISCONTINUED | OUTPATIENT
Start: 2024-02-03 | End: 2024-02-03 | Stop reason: HOSPADM

## 2024-02-03 RX ORDER — POLYETHYLENE GLYCOL 3350 17 G/17G
17 POWDER, FOR SOLUTION ORAL DAILY PRN
Status: DISCONTINUED | OUTPATIENT
Start: 2024-02-03 | End: 2024-02-05 | Stop reason: HOSPADM

## 2024-02-03 RX ORDER — SIMETHICONE 80 MG
80 TABLET,CHEWABLE ORAL 4 TIMES DAILY PRN
Status: DISCONTINUED | OUTPATIENT
Start: 2024-02-03 | End: 2024-02-05 | Stop reason: HOSPADM

## 2024-02-03 RX ORDER — OXYTOCIN/0.9 % SODIUM CHLORIDE 30/500 ML
250 PLASTIC BAG, INJECTION (ML) INTRAVENOUS CONTINUOUS
Status: DISPENSED | OUTPATIENT
Start: 2024-02-03 | End: 2024-02-03

## 2024-02-03 RX ORDER — FAMOTIDINE 10 MG/ML
20 INJECTION, SOLUTION INTRAVENOUS ONCE AS NEEDED
Status: DISCONTINUED | OUTPATIENT
Start: 2024-02-03 | End: 2024-02-03 | Stop reason: HOSPADM

## 2024-02-03 RX ORDER — EPHEDRINE SULFATE 5 MG/ML
INJECTION INTRAVENOUS
Status: DISCONTINUED
Start: 2024-02-03 | End: 2024-02-03 | Stop reason: WASHOUT

## 2024-02-03 RX ORDER — ACETAMINOPHEN 325 MG/1
650 TABLET ORAL EVERY 6 HOURS PRN
Status: DISCONTINUED | OUTPATIENT
Start: 2024-02-03 | End: 2024-02-05 | Stop reason: HOSPADM

## 2024-02-03 RX ORDER — LIDOCAINE HYDROCHLORIDE 10 MG/ML
0.5 INJECTION, SOLUTION EPIDURAL; INFILTRATION; INTRACAUDAL; PERINEURAL ONCE AS NEEDED
Status: DISCONTINUED | OUTPATIENT
Start: 2024-02-03 | End: 2024-02-03 | Stop reason: HOSPADM

## 2024-02-03 RX ORDER — CARBOPROST TROMETHAMINE 250 UG/ML
250 INJECTION, SOLUTION INTRAMUSCULAR AS NEEDED
Status: DISCONTINUED | OUTPATIENT
Start: 2024-02-03 | End: 2024-02-03 | Stop reason: HOSPADM

## 2024-02-03 RX ORDER — PRENATAL VIT/IRON FUM/FOLIC AC 27MG-0.8MG
1 TABLET ORAL DAILY
Status: DISCONTINUED | OUTPATIENT
Start: 2024-02-04 | End: 2024-02-05 | Stop reason: HOSPADM

## 2024-02-03 RX ORDER — SODIUM CHLORIDE, SODIUM LACTATE, POTASSIUM CHLORIDE, CALCIUM CHLORIDE 600; 310; 30; 20 MG/100ML; MG/100ML; MG/100ML; MG/100ML
125 INJECTION, SOLUTION INTRAVENOUS CONTINUOUS
Status: DISCONTINUED | OUTPATIENT
Start: 2024-02-03 | End: 2024-02-03

## 2024-02-03 RX ORDER — ACETAMINOPHEN 325 MG/1
650 TABLET ORAL EVERY 4 HOURS PRN
Status: DISCONTINUED | OUTPATIENT
Start: 2024-02-03 | End: 2024-02-03 | Stop reason: HOSPADM

## 2024-02-03 RX ORDER — EPHEDRINE SULFATE 5 MG/ML
10 INJECTION INTRAVENOUS
Status: DISCONTINUED | OUTPATIENT
Start: 2024-02-03 | End: 2024-02-03 | Stop reason: HOSPADM

## 2024-02-03 RX ORDER — ONDANSETRON 2 MG/ML
4 INJECTION INTRAMUSCULAR; INTRAVENOUS EVERY 6 HOURS PRN
Status: DISCONTINUED | OUTPATIENT
Start: 2024-02-03 | End: 2024-02-05 | Stop reason: HOSPADM

## 2024-02-03 RX ORDER — MAGNESIUM CARB/ALUMINUM HYDROX 105-160MG
30 TABLET,CHEWABLE ORAL ONCE
Status: DISCONTINUED | OUTPATIENT
Start: 2024-02-03 | End: 2024-02-03 | Stop reason: HOSPADM

## 2024-02-03 RX ORDER — IBUPROFEN 600 MG/1
600 TABLET ORAL EVERY 6 HOURS PRN
Status: DISCONTINUED | OUTPATIENT
Start: 2024-02-03 | End: 2024-02-05 | Stop reason: HOSPADM

## 2024-02-03 RX ORDER — MISOPROSTOL 200 UG/1
800 TABLET ORAL AS NEEDED
Status: DISCONTINUED | OUTPATIENT
Start: 2024-02-03 | End: 2024-02-03 | Stop reason: HOSPADM

## 2024-02-03 RX ORDER — CITRIC ACID/SODIUM CITRATE 334-500MG
30 SOLUTION, ORAL ORAL ONCE
Status: DISCONTINUED | OUTPATIENT
Start: 2024-02-03 | End: 2024-02-03 | Stop reason: HOSPADM

## 2024-02-03 RX ORDER — DOCUSATE SODIUM 100 MG/1
100 CAPSULE, LIQUID FILLED ORAL 2 TIMES DAILY
Status: DISCONTINUED | OUTPATIENT
Start: 2024-02-04 | End: 2024-02-05 | Stop reason: HOSPADM

## 2024-02-03 RX ORDER — DIPHENHYDRAMINE HYDROCHLORIDE 50 MG/ML
12.5 INJECTION INTRAMUSCULAR; INTRAVENOUS EVERY 8 HOURS PRN
Status: DISCONTINUED | OUTPATIENT
Start: 2024-02-03 | End: 2024-02-03 | Stop reason: HOSPADM

## 2024-02-03 RX ORDER — ONDANSETRON 2 MG/ML
4 INJECTION INTRAMUSCULAR; INTRAVENOUS ONCE AS NEEDED
Status: DISCONTINUED | OUTPATIENT
Start: 2024-02-03 | End: 2024-02-03 | Stop reason: HOSPADM

## 2024-02-03 RX ORDER — ONDANSETRON 4 MG/1
4 TABLET, ORALLY DISINTEGRATING ORAL EVERY 6 HOURS PRN
Status: DISCONTINUED | OUTPATIENT
Start: 2024-02-03 | End: 2024-02-05 | Stop reason: HOSPADM

## 2024-02-03 RX ORDER — SODIUM CHLORIDE 0.9 % (FLUSH) 0.9 %
10 SYRINGE (ML) INJECTION EVERY 12 HOURS SCHEDULED
Status: DISCONTINUED | OUTPATIENT
Start: 2024-02-03 | End: 2024-02-03 | Stop reason: HOSPADM

## 2024-02-03 RX ORDER — LIDOCAINE HYDROCHLORIDE AND EPINEPHRINE 15; 5 MG/ML; UG/ML
INJECTION, SOLUTION EPIDURAL AS NEEDED
Status: DISCONTINUED | OUTPATIENT
Start: 2024-02-03 | End: 2024-02-03 | Stop reason: SURG

## 2024-02-03 RX ORDER — BISACODYL 10 MG
10 SUPPOSITORY, RECTAL RECTAL DAILY PRN
Status: DISCONTINUED | OUTPATIENT
Start: 2024-02-04 | End: 2024-02-05 | Stop reason: HOSPADM

## 2024-02-03 RX ORDER — ROPIVACAINE HYDROCHLORIDE 2 MG/ML
15 INJECTION, SOLUTION EPIDURAL; INFILTRATION; PERINEURAL CONTINUOUS
Status: DISCONTINUED | OUTPATIENT
Start: 2024-02-03 | End: 2024-02-03

## 2024-02-03 RX ORDER — SODIUM CHLORIDE 0.9 % (FLUSH) 0.9 %
10 SYRINGE (ML) INJECTION AS NEEDED
Status: DISCONTINUED | OUTPATIENT
Start: 2024-02-03 | End: 2024-02-03 | Stop reason: HOSPADM

## 2024-02-03 RX ORDER — HYDROCODONE BITARTRATE AND ACETAMINOPHEN 5; 325 MG/1; MG/1
1 TABLET ORAL EVERY 4 HOURS PRN
Status: DISCONTINUED | OUTPATIENT
Start: 2024-02-03 | End: 2024-02-05 | Stop reason: HOSPADM

## 2024-02-03 RX ADMIN — LIDOCAINE HYDROCHLORIDE AND EPINEPHRINE 2 ML: 15; 5 INJECTION, SOLUTION EPIDURAL at 16:12

## 2024-02-03 RX ADMIN — SODIUM CHLORIDE, POTASSIUM CHLORIDE, SODIUM LACTATE AND CALCIUM CHLORIDE 1000 ML: 600; 310; 30; 20 INJECTION, SOLUTION INTRAVENOUS at 15:51

## 2024-02-03 RX ADMIN — Medication: at 23:53

## 2024-02-03 RX ADMIN — SODIUM CHLORIDE, POTASSIUM CHLORIDE, SODIUM LACTATE AND CALCIUM CHLORIDE 1500 ML: 600; 310; 30; 20 INJECTION, SOLUTION INTRAVENOUS at 15:26

## 2024-02-03 RX ADMIN — ROPIVACAINE HYDROCHLORIDE 15 ML/HR: 2 INJECTION, SOLUTION EPIDURAL; INFILTRATION at 16:17

## 2024-02-03 RX ADMIN — WITCH HAZEL: 500 SOLUTION RECTAL; TOPICAL at 23:53

## 2024-02-03 RX ADMIN — FENTANYL CITRATE 100 MCG: 50 INJECTION, SOLUTION INTRAMUSCULAR; INTRAVENOUS at 16:15

## 2024-02-03 RX ADMIN — ROPIVACAINE HYDROCHLORIDE 7 ML: 5 INJECTION, SOLUTION EPIDURAL; INFILTRATION; PERINEURAL at 16:15

## 2024-02-03 RX ADMIN — LIDOCAINE HYDROCHLORIDE AND EPINEPHRINE 3 ML: 15; 5 INJECTION, SOLUTION EPIDURAL at 16:09

## 2024-02-03 RX ADMIN — Medication 1 APPLICATION: at 23:53

## 2024-02-03 RX ADMIN — BENZOCAINE 1 APPLICATION: 5.6 OINTMENT TOPICAL at 23:53

## 2024-02-03 RX ADMIN — Medication 250 ML/HR: at 20:46

## 2024-02-03 RX ADMIN — Medication 999 ML/HR: at 20:09

## 2024-02-03 NOTE — H&P
History and Physical     Name: Tania Banda    : 1990     MRN: [id number]       Chief Complaint:     Contractions     Subjective     History of Present Illness:  Tania Banda is a 33 y.o.  39w0d who presents today with contractions.   No LOF   Uncomplicated prenatal course   GBS neg     ADELINE: Estimated Date of Delivery: 2/10/24     ROS:   As noted in HPI.     Past Medical History:   Diagnosis Date    Anxiety and depression     AVM (arteriovenous malformation) of right kidney     COVID-19 affecting pregnancy in second trimester 10/2023      Past Surgical History:   Procedure Laterality Date    D & C WITH SUCTION  2021    EMBOLIZATION Right 2010    Right kidney AVM    EMBOLIZATION Right 2011    Right kidney AVM    GUM SURGERY  2017    TONSILLECTOMY  2016    URETERAL STENT INSERTION Right 2009    WISDOM TOOTH EXTRACTION  2007      History reviewed. No pertinent family history.   OB History          3    Para   1    Term   1            AB   1    Living   1         SAB   1    IAB        Ectopic        Molar        Multiple   0    Live Births   1              Prior to Admission medications    Medication Sig Start Date End Date Taking? Authorizing Provider   aspirin 81 MG EC tablet Take 1 tablet by mouth Daily.   Yes Yvette Morris MD   docusate sodium 100 MG capsule Take 1 capsule by mouth 2 (Two) Times a Day As Needed for Constipation. 22  Yes Meagan Mays CNM   Polyethylene Glycol 3350 (MIRALAX PO) Take  by mouth.   Yes Yvette Morris MD   Prenatal Vit-Fe Fumarate-FA (Prenatal 27-1) 27-1 MG tablet tablet Take 1 tablet by mouth Daily.   Yes Yvette Morris MD        Current Facility-Administered Medications:     acetaminophen (TYLENOL) tablet 650 mg, 650 mg, Oral, Q4H PRN, Tawanna Padilla MD    lactated ringers bolus 1,500 mL, 1,500 mL, Intravenous, Once, Tawanna Padilla MD    lactated ringers infusion, 125 mL/hr,  "Intravenous, Continuous, Tawanna Padilla MD    lidocaine PF 1% (XYLOCAINE) injection 0.5 mL, 0.5 mL, Intradermal, Once PRN, Tawanna Padilla MD    mineral oil liquid 30 mL, 30 mL, Topical, Once, Tawanna Padilla MD    sodium chloride 0.9 % flush 10 mL, 10 mL, Intravenous, Q12H, Tawanna Padilla MD    sodium chloride 0.9 % flush 10 mL, 10 mL, Intravenous, PRN, Tawanna Padilla MD    sodium chloride 0.9 % infusion 40 mL, 40 mL, Intravenous, PRN, Tawanna Padilla MD    Objective     Vital Signs  /84 (BP Location: Right arm, Patient Position: Sitting)   Temp 98.1 °F (36.7 °C) (Oral)   Resp 18   Ht 160 cm (63\")   Wt 98 kg (216 lb)   Estimated body mass index is 38.26 kg/m² as calculated from the following:    Height as of this encounter: 160 cm (63\").    Weight as of this encounter: 98 kg (216 lb).    Physical Exam    NAD   Normal pulmonary effort   Abd gravid   SVE (per nursing exam): 4-5/80/-2    EFM: , +accels, no decels, mod BTBV, cat 1   [place]: q2-3    Labs pending       Assessment and Plan       33 y.o.  39w0d in labor   FWB reassuring   GBS neg   Labs pending   Admit to LDR   Orders placed   Dr Sebastian alerted       I spent 20 minutes caring for the patient on the day of service. This included: obtaining or reviewing a separately obtained medical history, reviewing patient records, performing a medically appropriate exam and/or evaluation, counseling or educating the patient/family/caregiver, ordering medications, labs, and/or procedures and documenting such in the medical record. This does not include time spent on review and interpretation of other tests such as fetal ultrasound or the performance of other procedures such as amniocentesis or CVS.    Tawanna Padilla MD FACOG  Maternal Fetal Medicine, [facility] [place]     2024  "

## 2024-02-03 NOTE — PROGRESS NOTES
"02/03/24  16:58 EST  Tania Banda    SUBJECTIVE: Tania is resting in bed, comfortable with epidural, agreeable to AROM.     ASSESSMENTS:     /76   Pulse 97   Temp 98.1 °F (36.7 °C) (Oral)   Resp 18   Ht 160 cm (63\")   Wt 98 kg (216 lb)   BMI 38.26 kg/m²     Fetal Heart Rate Assessment   Method: Fetal HR Assessment Method: external   Beats/min: Fetal HR (beats/min): 155   Baseline: Fetal HR Baseline: normal range   Variability: Fetal HR Variability: moderate (amplitude range 6 to 25 bpm)   Accels: Fetal HR Accelerations: greater than/equal to 15 bpm   Decels: Fetal HR Decelerations: absent   Tracing Category:  I     Uterine Assessment   Method: Method: external tocotransducer   Frequency (min): Contraction Frequency (Minutes): 4-5   Ctx Count in 10 min:     Duration:     Intensity: Contraction Intensity: moderate by palpation   Intensity by IUPC:     Resting Tone: Uterine Resting Tone: soft by palpation   Resting Tone by IUPC:       Presentation: vtx   Cervix: Exam by: Method: sterile exam per physician   Dilation:  5cm   Effacement: Cervical Effacement: 80%   Station:  -2            PLAN: AROM performed, clear fluid noted. FHR reassuring, category I. Repeat SVE q2-4h or PRN.    Rhonda Sebastian MD  16:58 EST  02/03/24    "

## 2024-02-03 NOTE — ANESTHESIA PREPROCEDURE EVALUATION
Anesthesia Evaluation     Patient summary reviewed and Nursing notes reviewed                Airway   Mallampati: II  TM distance: >3 FB  Neck ROM: full  No difficulty expected  Dental - normal exam     Pulmonary - negative pulmonary ROS   Cardiovascular - negative cardio ROS        Neuro/Psych  (+) psychiatric history Anxiety and Depression  GI/Hepatic/Renal/Endo - negative ROS     Musculoskeletal (-) negative ROS    Abdominal    Substance History - negative use     OB/GYN    (+) Pregnant        Other - negative ROS                   Anesthesia Plan    ASA 2     epidural       Anesthetic plan, risks, benefits, and alternatives have been provided, discussed and informed consent has been obtained with: patient.    CODE STATUS:    Code Status (Patient has no pulse and is not breathing): CPR (Attempt to Resuscitate)  Medical Interventions (Patient has pulse or is breathing): Full

## 2024-02-03 NOTE — ANESTHESIA PROCEDURE NOTES
Labor Epidural      Patient reassessed immediately prior to procedure    Patient location during procedure: OB  Performed By  Anesthesiologist: Thelma Petty DO  Preanesthetic Checklist  Completed: patient identified, IV checked, risks and benefits discussed, surgical consent, monitors and equipment checked, pre-op evaluation and timeout performed  Additional Notes  CSE performed using 25g Jayy  Prep:  Pt Position:sitting  Sterile Tech:cap, gloves, mask and sterile barrier  Prep:chlorhexidine gluconate and isopropyl alcohol  Monitoring:blood pressure monitoring  Epidural Block Procedure:  Approach:midline  Guidance:palpation technique  Location:L3-L4  Needle Type:Tuohy  Needle Gauge:17 G  Loss of Resistance Medium: air  Loss of Resistance: 5cm  Cath Depth at skin:11 cm  Paresthesia: none  Aspiration:negative  Test Dose:negative  Number of Attempts: 1  Post Assessment:  Dressing:occlusive dressing applied and secured with tape  Pt Tolerance:patient tolerated the procedure well with no apparent complications  Complications:no

## 2024-02-04 LAB
ABO GROUP BLD: NORMAL
FETAL BLEED: NEGATIVE
HCT VFR BLD AUTO: 34.5 % (ref 34–46.6)
HGB BLD-MCNC: 11.8 G/DL (ref 12–15.9)
NUMBER OF DOSES: NORMAL
RH BLD: NEGATIVE

## 2024-02-04 PROCEDURE — 85461 HEMOGLOBIN FETAL: CPT | Performed by: ADVANCED PRACTICE MIDWIFE

## 2024-02-04 PROCEDURE — 85014 HEMATOCRIT: CPT | Performed by: OBSTETRICS & GYNECOLOGY

## 2024-02-04 PROCEDURE — 85018 HEMOGLOBIN: CPT | Performed by: OBSTETRICS & GYNECOLOGY

## 2024-02-04 PROCEDURE — 86901 BLOOD TYPING SEROLOGIC RH(D): CPT | Performed by: ADVANCED PRACTICE MIDWIFE

## 2024-02-04 PROCEDURE — 25010000002 RHO D IMMUNE GLOBULIN 1500 UNIT/2ML SOLUTION PREFILLED SYRINGE: Performed by: ADVANCED PRACTICE MIDWIFE

## 2024-02-04 PROCEDURE — 86900 BLOOD TYPING SEROLOGIC ABO: CPT | Performed by: ADVANCED PRACTICE MIDWIFE

## 2024-02-04 RX ADMIN — IBUPROFEN 600 MG: 600 TABLET, FILM COATED ORAL at 00:08

## 2024-02-04 RX ADMIN — IBUPROFEN 600 MG: 600 TABLET, FILM COATED ORAL at 11:44

## 2024-02-04 RX ADMIN — IBUPROFEN 600 MG: 600 TABLET, FILM COATED ORAL at 05:29

## 2024-02-04 RX ADMIN — DOCUSATE SODIUM 100 MG: 100 CAPSULE, LIQUID FILLED ORAL at 20:21

## 2024-02-04 RX ADMIN — HUMAN RHO(D) IMMUNE GLOBULIN 1500 UNITS: 1500 SOLUTION INTRAMUSCULAR; INTRAVENOUS at 11:44

## 2024-02-04 RX ADMIN — POLYETHYLENE GLYCOL 3350 17 G: 17 POWDER, FOR SOLUTION ORAL at 14:44

## 2024-02-04 RX ADMIN — IBUPROFEN 600 MG: 600 TABLET, FILM COATED ORAL at 17:23

## 2024-02-04 NOTE — PROGRESS NOTES
2/4/2024  PPD #1    Subjective   Tania feels well.  Patient describes her lochia as less than menses.  Pain is well controlled       Objective   Temp: Temp:  [97.7 °F (36.5 °C)-98.1 °F (36.7 °C)] 98.1 °F (36.7 °C) Temp src: Oral   BP: BP: ()/(53-93) 115/78        Pulse: Heart Rate:  [] 98  RR: Resp:  [16-20] 18    General:  No acute distress   Abdomen: Fundus firm and beneath umbilicus   Pelvis: deferred     Lab Results   Component Value Date    WBC 13.30 (H) 02/03/2024    HGB 11.8 (L) 02/04/2024    HCT 34.5 02/04/2024    MCV 94.6 02/03/2024     02/03/2024    AST 24 07/15/2022    ALT 9 07/15/2022    URICACID 4.5 07/15/2022       Assessment  PPD# 1 after vaginal delivery    Plan  Continue routine postpartum care, anticipate discharge in a.m.      This note has been electronically signed.    Rhonda Sebastian MD  11:16 EST  February 4, 2024

## 2024-02-04 NOTE — ANESTHESIA POSTPROCEDURE EVALUATION
Patient: Tania Rivero Iancu    Procedure Summary       Date: 02/03/24 Room / Location:     Anesthesia Start: 1602 Anesthesia Stop: 2005    Procedure: LABOR ANALGESIA Diagnosis:     Scheduled Providers:  Provider: Thelma Petty DO    Anesthesia Type: epidural ASA Status: 2            Anesthesia Type: epidural    Vitals  Vitals Value Taken Time   /78 02/04/24 0845   Temp 98.1 °F (36.7 °C) 02/04/24 0845   Pulse 98 02/04/24 0845   Resp 18 02/04/24 0845   SpO2             Post Anesthesia Care and Evaluation    Patient location during evaluation: bedside  Patient participation: complete - patient participated  Level of consciousness: awake  Pain score: 0  Pain management: satisfactory to patient    Airway patency: patent  Anesthetic complications: No anesthetic complications  PONV Status: none  Cardiovascular status: acceptable and hemodynamically stable  Respiratory status: acceptable  Hydration status: acceptable  Post Neuraxial Block status: Motor and sensory function returned to baseline and No signs or symptoms of PDPH

## 2024-02-04 NOTE — L&D DELIVERY NOTE
" [facility]   Vaginal Delivery Note    Patient Name: Tania Carrillou  : 1990  MRN: [id number]    Date of Delivery: 2/3/2024     Diagnosis     Pre & Post-Delivery:  Intrauterine pregnancy at 39w0d  Labor status: Spontaneous Onset of Labor     Normal labor             Problem List    Transfer to Postpartum     Review the Delivery Report for details.     Delivery     Delivery: Vaginal, Spontaneous     YOB: 2023    Time of Birth:  Gestational Age 8:01 PM   39w0d     Anesthesia: Epidural     Delivering clinician: Rhonda Sebastian    Forceps?   No   Vacuum? No    Shoulder dystocia present: No        Delivery narrative:  Uncomplicated  at 39w0d over an intact perineum. Anterior shoulder delivered with ease. Infant vigorous at delivery so placed on maternal abdomen. Delayed cord clamping x 1 min. Cord doubly clamped and cut by father. Cord blood and cord segment obtained. Placenta delivered spontaneously and appeared intact. Careful inspection of the vagina and perineum revealed a supra-urethral laceration which was reapproximated to hemostasis using 3-0 vicryl. No other lacerations requiring repair were noted. Uterus was initially boggy and bleeding was brisk, but this improved with fundal massage, bimanual compression, and IV pitocin administration.        Infant     Findings: female  infant     Infant observations: Weight: 3240 g (7 lb 2.3 oz)   Length: 20  in  Observations/Comments:        Apgars: 8  @ 1 minute /    9  @ 5 minutes   Infant Name: Bianca     Placenta & Cord         Placenta delivered  Spontaneous  at   2/3/2024  8:05 PM     Cord: 3 vessels  present.   Nuchal Cord?  no   Cord blood obtained: Yes    Cord gases obtained:  No    Cord gas results: Venous:  No results found for: \"PHCVEN\", \"BECVEN\"    Arterial:  No results found for: \"PHCART\", \"BECART\"     Repair     Episiotomy: Not recorded     No    Lacerations: Yes  Laceration Information  Laceration Repaired?   Perineal:  "      Periurethral:  yes  yes   Labial:       Sulcus:       Vaginal:       Cervical:         Suture used for repair: 3-0 Vicryl     Estimated Blood Loss:  300cc     Complications     none    Disposition     Mother to Mother Baby/Postpartum  in stable condition currently.  Baby remains with mom in stable condition currently.    Rhonda Seabstian MD  02/03/24  20:24 EST

## 2024-02-04 NOTE — PLAN OF CARE
Goal Outcome Evaluation:  Vital signs WDL, lochia light, voiding without difficulty, non-breastfeeding, pain controlled with motrin.

## 2024-02-04 NOTE — LACTATION NOTE
Per patient, patient desires to formula feed.  No questions/concerns at this time.  Encouraged to call lactation if need arises.

## 2024-02-04 NOTE — PLAN OF CARE
Goal Outcome Evaluation:  Plan of Care Reviewed With: patient, spouse        Progress: improving  Outcome Evaluation: vss,fundus/lochia.perinuem wdl, pain controlled with prn pain medication,voiding without difficulty, utilizing carmelo-products, bottle feeding , with plans to breast feed as well.

## 2024-02-05 VITALS
RESPIRATION RATE: 18 BRPM | SYSTOLIC BLOOD PRESSURE: 120 MMHG | HEART RATE: 77 BPM | DIASTOLIC BLOOD PRESSURE: 81 MMHG | WEIGHT: 216 LBS | HEIGHT: 63 IN | TEMPERATURE: 98.1 F | BODY MASS INDEX: 38.27 KG/M2

## 2024-02-05 RX ORDER — PSEUDOEPHEDRINE HCL 30 MG
100 TABLET ORAL 2 TIMES DAILY
Qty: 60 CAPSULE | Refills: 0 | Status: SHIPPED | OUTPATIENT
Start: 2024-02-05

## 2024-02-05 RX ORDER — IBUPROFEN 600 MG/1
600 TABLET ORAL EVERY 6 HOURS PRN
Qty: 60 TABLET | Refills: 0 | Status: SHIPPED | OUTPATIENT
Start: 2024-02-05

## 2024-02-05 RX ADMIN — IBUPROFEN 600 MG: 600 TABLET, FILM COATED ORAL at 07:16

## 2024-02-05 RX ADMIN — WITCH HAZEL: 500 SOLUTION RECTAL; TOPICAL at 07:25

## 2024-02-05 RX ADMIN — PRENATAL VITAMINS-IRON FUMARATE 27 MG IRON-FOLIC ACID 0.8 MG TABLET 1 TABLET: at 07:16

## 2024-02-05 RX ADMIN — DOCUSATE SODIUM 100 MG: 100 CAPSULE, LIQUID FILLED ORAL at 07:16

## 2024-02-05 NOTE — DISCHARGE SUMMARY
[facility]  Discharge Summary      Patient: Tania Banda            : 1990  MRN: [id number]  CSN: [id number]  Consult:   Consults       No orders found from 2024 to 2024.               Gestational Age at Discharge:  39w0d, delivered      Admission  Diagnosis:Labor    Discharge Diagnosis:   Patient Active Problem List   Diagnosis    Prenatal care, subsequent pregnancy in third trimester    COVID-19 affecting pregnancy in second trimester    Decreased fetal movement, third trimester, fetus 1     (normal spontaneous vaginal delivery)       Date of Admission: 2/3/2024    Date of Discharge:  24    Procedures:             Service:  Obstetrics    Hospital Course:       Pt admitted for  labor  after uncomplicated pregnancy. She was delivered without complication. She delivered a VFI with Apgars 8/9 via . See note for full detail. Her postpartum course was uncomplicated and was dc'd home on PPD 2    Labs:    Lab Results (last 24 hours)       ** No results found for the last 24 hours. **          HOSPITAL LABS:  Lab Results   Component Value Date    WBC 13.30 (H) 2024    HGB 11.8 (L) 2024    HCT 34.5 2024    MCV 94.6 2024     2024    CREATININE 0.74 07/15/2022    URICACID 4.5 07/15/2022    AST 24 07/15/2022    ALT 9 07/15/2022     (H) 07/15/2022     Results from last 7 days   Lab Units 24  0515 24  1520   ABO TYPING  O O   RH TYPING  Negative Negative   ANTIBODY SCREEN   --  Positive       IMAGING        Discharge Medications     Discharge Medications        New Medications        Instructions Start Date   docusate sodium 100 MG capsule   100 mg, Oral, 2 Times Daily      ibuprofen 600 MG tablet  Commonly known as: ADVIL,MOTRIN   600 mg, Oral, Every 6 Hours PRN             Continue These Medications        Instructions Start Date   MIRALAX PO   Oral      Prenatal 27-1 27-1 MG tablet tablet   1 tablet, Oral, Daily             Stop  These Medications      aspirin 81 MG EC tablet              Allergies:   Allergies   Allergen Reactions    Sulfa Antibiotics Swelling     Severe facial swelling and rash        Discharge Disposition:  To Home    Discharge Condition:  Stable    Discharge Diet: Regular    Activity at Discharge: pelvic rest,     Follow-up Appointments: 6 wk        Erin Bello MD  02/05/24  09:06 EST

## 2024-02-05 NOTE — PROGRESS NOTES
[facility]  Obstetric Progress Note    Chief Complaint: PPD 2    Subjective     Feeling well. Pain controlled. angelo diet. +amb/void. Lochia appr  Objective     Vital Signs Range for the last 24 hours  Temp:  [97.8 °F (36.6 °C)-98.1 °F (36.7 °C)] 98.1 °F (36.7 °C)   BP: (101-120)/(69-81) 120/81   Heart Rate:  [77-80] 77   Resp:  [18] 18                   Intake/Output last 24 hours:    No intake or output data in the 24 hours ending 24 0904    Intake/Output this shift:    No intake/output data recorded.    Physical Exam:  General: No acute distress   Heart RRR   Lungs CTAB     Abdomen Soft, non tender, fundus firm   Extremities Exam of extremities: pedal edema tr +       Laboratory Results:    WBC   Date Value Ref Range Status   2024 13.30 (H) 3.40 - 10.80 10*3/mm3 Final     RBC   Date Value Ref Range Status   2024 3.72 (L) 3.77 - 5.28 10*6/mm3 Final     Hemoglobin   Date Value Ref Range Status   2024 11.8 (L) 12.0 - 15.9 g/dL Final     Hematocrit   Date Value Ref Range Status   2024 34.5 34.0 - 46.6 % Final     MCV   Date Value Ref Range Status   2024 94.6 79.0 - 97.0 fL Final     MCH   Date Value Ref Range Status   2024 33.1 (H) 26.6 - 33.0 pg Final     MCHC   Date Value Ref Range Status   2024 34.9 31.5 - 35.7 g/dL Final     RDW   Date Value Ref Range Status   2024 13.0 12.3 - 15.4 % Final     RDW-SD   Date Value Ref Range Status   2024 44.8 37.0 - 54.0 fl Final     MPV   Date Value Ref Range Status   2024 11.7 6.0 - 12.0 fL Final     Platelets   Date Value Ref Range Status   2024 204 140 - 450 10*3/mm3 Final         Assessment/Plan: PPD 2 s/p   1.RPPC: Advance care  2. Dc home  3. RH neg, s/p Rhogam            Erin S Beaven, MD  2024  09:04 EST

## 2024-02-12 ENCOUNTER — MATERNAL SCREENING (OUTPATIENT)
Dept: CALL CENTER | Facility: HOSPITAL | Age: 34
End: 2024-02-12
Payer: COMMERCIAL

## 2024-05-10 ENCOUNTER — LAB REQUISITION (OUTPATIENT)
Dept: LAB | Facility: HOSPITAL | Age: 34
End: 2024-05-10
Payer: COMMERCIAL

## 2024-05-10 DIAGNOSIS — Z30.2 ENCOUNTER FOR STERILIZATION: ICD-10-CM

## 2024-05-10 PROCEDURE — 88302 TISSUE EXAM BY PATHOLOGIST: CPT | Performed by: OBSTETRICS & GYNECOLOGY

## 2024-08-28 ENCOUNTER — OFFICE VISIT (OUTPATIENT)
Dept: ORTHOPEDIC SURGERY | Facility: CLINIC | Age: 34
End: 2024-08-28
Payer: COMMERCIAL

## 2024-08-28 VITALS
BODY MASS INDEX: 29.54 KG/M2 | DIASTOLIC BLOOD PRESSURE: 78 MMHG | HEIGHT: 63 IN | SYSTOLIC BLOOD PRESSURE: 118 MMHG | WEIGHT: 166.7 LBS

## 2024-08-28 DIAGNOSIS — M79.672 LEFT FOOT PAIN: Primary | ICD-10-CM

## 2024-08-28 PROCEDURE — 99203 OFFICE O/P NEW LOW 30 MIN: CPT | Performed by: ORTHOPAEDIC SURGERY

## 2024-08-28 RX ORDER — ERGOCALCIFEROL 1.25 MG/1
CAPSULE, LIQUID FILLED ORAL
COMMUNITY
Start: 2024-06-12

## 2024-08-28 NOTE — PROGRESS NOTES
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  I have actually seen her father as a patient.  She is now a stay-at-home mom.  She has noted that her left big toe has always had a bump on it.  She thought it was a bunion.  Over the past several years she has now been having pain.  Over the past couple months it seems to be aggravated.  It hurts most if she tries to go up on tiptoe.  It also hurts with a tight shoe.  It can range 4-7 out of 10.  She has not had any treatment as yet.  She does try to wear good shoes.      The patient is uncertain if she has a personal or family history of DVT, PE, hypercoagulable state or risk factors for clotting.         Current Outpatient Medications on File Prior to Visit   Medication Sig Dispense Refill    Polyethylene Glycol 3350 (MIRALAX PO) Take  by mouth.      vitamin D (ERGOCALCIFEROL) 1.25 MG (04064 UT) capsule capsule       [DISCONTINUED] docusate sodium 100 MG capsule Take 1 capsule by mouth 2 (Two) Times a Day. 60 capsule 0    [DISCONTINUED] ibuprofen (ADVIL,MOTRIN) 600 MG tablet Take 1 tablet by mouth Every 6 (Six) Hours As Needed for Mild Pain (First Line: Mild pain.). 60 tablet 0    [DISCONTINUED] Prenatal Vit-Fe Fumarate-FA (Prenatal ) 27-1 MG tablet tablet Take 1 tablet by mouth Daily.       No current facility-administered medications on file prior to visit.      Allergies   Allergen Reactions    Sulfa Antibiotics Swelling     Severe facial swelling and rash      Past Medical History:   Diagnosis Date    Anxiety and depression     AVM (arteriovenous malformation) of right kidney 2010    COVID-19 affecting pregnancy in second trimester 10/2023    CTS (carpal tunnel  syndrome) 2022    Occurred when pregnant but I have not noticed it in the past 6 months    Fracture, foot 2004?    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     Past Surgical History:   Procedure Laterality Date    D & C WITH SUCTION  09/2021    EMBOLIZATION Right 2010    Right kidney AVM    EMBOLIZATION Right 2011    Right kidney AVM    GUM SURGERY  2017    LAPAROSCOPIC TUBAL LIGATION      TONSILLECTOMY  2016    URETERAL STENT INSERTION Right 2009    WISDOM TOOTH EXTRACTION  2007     Family History   Problem Relation Age of Onset    Cancer Mother     Cancer Father     Cancer Maternal Grandfather     Cancer Maternal Grandmother     Cancer Paternal Grandmother     Cancer Maternal Aunt       Social History     Socioeconomic History    Marital status:      Spouse name: diane    Number of children: 1    Highest education level: High school graduate   Tobacco Use    Smoking status: Never     Passive exposure: Never    Smokeless tobacco: Never   Vaping Use    Vaping status: Never Used   Substance and Sexual Activity    Alcohol use: Yes     Comment: Less than 1 glass of wine a month    Drug use: Never    Sexual activity: Yes     Partners: Male     Birth control/protection: Tubal ligation        Review of Systems   Constitutional: Negative.    HENT: Negative.     Eyes: Negative.    Respiratory: Negative.     Cardiovascular: Negative.    Gastrointestinal: Negative.    Endocrine: Negative.    Genitourinary: Negative.    Musculoskeletal:  Positive for arthralgias.   Skin: Negative.    Allergic/Immunologic: Negative.    Neurological: Negative.    Hematological: Negative.    Psychiatric/Behavioral: Negative.       The following portions of the patient's history were reviewed and updated as appropriate: allergies, current medications, past family history, past medical history, past social history, past surgical history, and  "problem list.    Physical Exam:   /78   Ht 160 cm (63\")   Wt 75.6 kg (166 lb 11.2 oz)   Breastfeeding No   BMI 29.53 kg/m²   GENERAL: Body habitus: overweight    Lower extremity edema: Right: none; Left: none    Varicose veins:  Right: none; Left: none    Gait: normal     Mental Status:  awake and alert; oriented to person, place, and time    Voice:  clear  SKIN:  Lower extremity: Normal    Hair Growth(lower extremity):  Right:normal; Left:  normal  NAILS: Toenails: normal  HEENT: Head: Normocephalic, atraumatic,  without obvious abnormality.  eye: normal external eye, no icterus  ears:normal external ears  nose: normal external nose  PULM:  Repiratory effort normal  CV:  Dorsalis Pedis:  Right: 2+; Left:2+    Posterior Tibial: Right:2+; Left:2+    Capillary Refill:  Brisk  MSK:          Foot:  Right:  non tender; Left:   Prominent osteophytes on the dorsum of the first metatarsal head, no significant valgus, pain with dorsiflexion of the toe, dorsiflexion limited to about 30 degrees      NEURO:       Lower extremity sensation: intact           Motor Function: all 5/5         Medical Decision Making    Data Review:   ordered and reviewed x-rays today    Assessment and Plan/ Diagnosis/Treatment options:   1. Left foot pain  I explained that she does not really have a bunion.  I showed her the x-rays and I explained the widening of the intermetatarsal space that occurs with bunions, she does not really have this, I explained that what she actually has is early arthritis.  I explained arthritis.  I explained the reason it hurts with dorsiflexion.  I would recommend starting with custom inserts.  I gave her prescription for this.  We went over everything in detail.  I will be happy to see her if it does not help enough    Part of this encounter note is an electronic transcription/translation of spoken language to printed text. The electronic translation of spoken language may permit erroneous, or at times, " nonsensical words or phrases to be inadvertently transcribed; Although I have reviewed the note for such errors, some may still exist.    NOTE TO PATIENT: The 21st Century Cures Act makes medical notes like these available to patients in the interest of transparency. However, be advised this is a medical document. It is intended as peer to peer communication. It is written in medical language and may contain abbreviations or verbiage that are unfamiliar. It may appear blunt or direct. Medical documents are intended to carry relevant information, facts as evident, and the clinical opinion of the practitioner.       Tran Mascorro MD

## 2024-11-06 PROCEDURE — 96374 THER/PROPH/DIAG INJ IV PUSH: CPT

## 2024-11-06 PROCEDURE — 96375 TX/PRO/DX INJ NEW DRUG ADDON: CPT

## 2024-11-06 PROCEDURE — 99285 EMERGENCY DEPT VISIT HI MDM: CPT

## 2024-11-07 ENCOUNTER — APPOINTMENT (OUTPATIENT)
Dept: CT IMAGING | Facility: HOSPITAL | Age: 34
End: 2024-11-07
Payer: COMMERCIAL

## 2024-11-07 ENCOUNTER — HOSPITAL ENCOUNTER (EMERGENCY)
Facility: HOSPITAL | Age: 34
Discharge: HOME | End: 2024-11-07
Attending: EMERGENCY MEDICINE | Admitting: EMERGENCY MEDICINE
Payer: COMMERCIAL

## 2024-11-07 VITALS
HEART RATE: 96 BPM | OXYGEN SATURATION: 99 % | DIASTOLIC BLOOD PRESSURE: 64 MMHG | TEMPERATURE: 97.9 F | WEIGHT: 160 LBS | SYSTOLIC BLOOD PRESSURE: 118 MMHG | BODY MASS INDEX: 28.35 KG/M2 | RESPIRATION RATE: 19 BRPM | HEIGHT: 63 IN

## 2024-11-07 DIAGNOSIS — R10.9 NONSPECIFIC ABDOMINAL PAIN: ICD-10-CM

## 2024-11-07 DIAGNOSIS — D72.829 LEUKOCYTOSIS, UNSPECIFIED TYPE: ICD-10-CM

## 2024-11-07 DIAGNOSIS — K52.9 GASTROENTERITIS: Primary | ICD-10-CM

## 2024-11-07 DIAGNOSIS — R11.2 NAUSEA AND VOMITING, UNSPECIFIED VOMITING TYPE: ICD-10-CM

## 2024-11-07 LAB
ALBUMIN SERPL-MCNC: 4.7 G/DL (ref 3.5–5.2)
ALBUMIN/GLOB SERPL: 1.3 G/DL
ALP SERPL-CCNC: 104 U/L (ref 39–117)
ALT SERPL W P-5'-P-CCNC: 13 U/L (ref 1–33)
ANION GAP SERPL CALCULATED.3IONS-SCNC: 15 MMOL/L (ref 5–15)
AST SERPL-CCNC: 18 U/L (ref 1–32)
BACTERIA UR QL AUTO: ABNORMAL /HPF
BASOPHILS # BLD AUTO: 0.03 10*3/MM3 (ref 0–0.2)
BASOPHILS NFR BLD AUTO: 0.2 % (ref 0–1.5)
BILIRUB SERPL-MCNC: 0.6 MG/DL (ref 0–1.2)
BILIRUB UR QL STRIP: NEGATIVE
BUN SERPL-MCNC: 16 MG/DL (ref 6–20)
BUN/CREAT SERPL: 19.5 (ref 7–25)
CALCIUM SPEC-SCNC: 9.6 MG/DL (ref 8.6–10.5)
CHLORIDE SERPL-SCNC: 96 MMOL/L (ref 98–107)
CLARITY UR: ABNORMAL
CO2 SERPL-SCNC: 21 MMOL/L (ref 22–29)
COLOR UR: YELLOW
CREAT SERPL-MCNC: 0.82 MG/DL (ref 0.57–1)
D-LACTATE SERPL-SCNC: 2 MMOL/L (ref 0.5–2)
DEPRECATED RDW RBC AUTO: 41.8 FL (ref 37–54)
EGFRCR SERPLBLD CKD-EPI 2021: 97 ML/MIN/1.73
EOSINOPHIL # BLD AUTO: 0.02 10*3/MM3 (ref 0–0.4)
EOSINOPHIL NFR BLD AUTO: 0.1 % (ref 0.3–6.2)
ERYTHROCYTE [DISTWIDTH] IN BLOOD BY AUTOMATED COUNT: 12.6 % (ref 12.3–15.4)
GLOBULIN UR ELPH-MCNC: 3.5 GM/DL
GLUCOSE SERPL-MCNC: 146 MG/DL (ref 65–99)
GLUCOSE UR STRIP-MCNC: NEGATIVE MG/DL
HCG INTACT+B SERPL-ACNC: <0.1 MIU/ML
HCT VFR BLD AUTO: 44.1 % (ref 34–46.6)
HGB BLD-MCNC: 14.8 G/DL (ref 12–15.9)
HGB UR QL STRIP.AUTO: NEGATIVE
HOLD SPECIMEN: NORMAL
IMM GRANULOCYTES # BLD AUTO: 0.1 10*3/MM3 (ref 0–0.05)
IMM GRANULOCYTES NFR BLD AUTO: 0.5 % (ref 0–0.5)
KETONES UR QL STRIP: ABNORMAL
LEUKOCYTE ESTERASE UR QL STRIP.AUTO: ABNORMAL
LIPASE SERPL-CCNC: 24 U/L (ref 13–60)
LYMPHOCYTES # BLD AUTO: 0.5 10*3/MM3 (ref 0.7–3.1)
LYMPHOCYTES NFR BLD AUTO: 2.7 % (ref 19.6–45.3)
MCH RBC QN AUTO: 30.3 PG (ref 26.6–33)
MCHC RBC AUTO-ENTMCNC: 33.6 G/DL (ref 31.5–35.7)
MCV RBC AUTO: 90.4 FL (ref 79–97)
MONOCYTES # BLD AUTO: 0.95 10*3/MM3 (ref 0.1–0.9)
MONOCYTES NFR BLD AUTO: 5.1 % (ref 5–12)
NEUTROPHILS NFR BLD AUTO: 17.03 10*3/MM3 (ref 1.7–7)
NEUTROPHILS NFR BLD AUTO: 91.4 % (ref 42.7–76)
NITRITE UR QL STRIP: NEGATIVE
NRBC BLD AUTO-RTO: 0 /100 WBC (ref 0–0.2)
PH UR STRIP.AUTO: 5.5 [PH] (ref 5–8)
PLATELET # BLD AUTO: 336 10*3/MM3 (ref 140–450)
PMV BLD AUTO: 10.4 FL (ref 6–12)
POTASSIUM SERPL-SCNC: 3.3 MMOL/L (ref 3.5–5.2)
PROT SERPL-MCNC: 8.2 G/DL (ref 6–8.5)
PROT UR QL STRIP: ABNORMAL
RBC # BLD AUTO: 4.88 10*6/MM3 (ref 3.77–5.28)
RBC # UR STRIP: ABNORMAL /HPF
REF LAB TEST METHOD: ABNORMAL
SODIUM SERPL-SCNC: 132 MMOL/L (ref 136–145)
SP GR UR STRIP: 1.04 (ref 1–1.03)
SQUAMOUS #/AREA URNS HPF: ABNORMAL /HPF
UROBILINOGEN UR QL STRIP: ABNORMAL
WBC # UR STRIP: ABNORMAL /HPF
WBC NRBC COR # BLD AUTO: 18.63 10*3/MM3 (ref 3.4–10.8)
WHOLE BLOOD HOLD COAG: NORMAL
WHOLE BLOOD HOLD SPECIMEN: NORMAL

## 2024-11-07 PROCEDURE — 96375 TX/PRO/DX INJ NEW DRUG ADDON: CPT

## 2024-11-07 PROCEDURE — 84702 CHORIONIC GONADOTROPIN TEST: CPT | Performed by: EMERGENCY MEDICINE

## 2024-11-07 PROCEDURE — 83690 ASSAY OF LIPASE: CPT | Performed by: EMERGENCY MEDICINE

## 2024-11-07 PROCEDURE — 25510000001 IOPAMIDOL 61 % SOLUTION: Performed by: EMERGENCY MEDICINE

## 2024-11-07 PROCEDURE — 74177 CT ABD & PELVIS W/CONTRAST: CPT

## 2024-11-07 PROCEDURE — 25810000003 SODIUM CHLORIDE 0.9 % SOLUTION: Performed by: PHYSICIAN ASSISTANT

## 2024-11-07 PROCEDURE — 81001 URINALYSIS AUTO W/SCOPE: CPT | Performed by: EMERGENCY MEDICINE

## 2024-11-07 PROCEDURE — 80053 COMPREHEN METABOLIC PANEL: CPT | Performed by: EMERGENCY MEDICINE

## 2024-11-07 PROCEDURE — 83605 ASSAY OF LACTIC ACID: CPT | Performed by: PHYSICIAN ASSISTANT

## 2024-11-07 PROCEDURE — 25010000002 ONDANSETRON PER 1 MG: Performed by: EMERGENCY MEDICINE

## 2024-11-07 PROCEDURE — 85025 COMPLETE CBC W/AUTO DIFF WBC: CPT

## 2024-11-07 PROCEDURE — 96374 THER/PROPH/DIAG INJ IV PUSH: CPT

## 2024-11-07 RX ORDER — MORPHINE SULFATE 2 MG/ML
2 INJECTION, SOLUTION INTRAMUSCULAR; INTRAVENOUS ONCE
Status: DISCONTINUED | OUTPATIENT
Start: 2024-11-07 | End: 2024-11-07

## 2024-11-07 RX ORDER — IBUPROFEN 800 MG/1
800 TABLET, FILM COATED ORAL ONCE
Status: COMPLETED | OUTPATIENT
Start: 2024-11-07 | End: 2024-11-07

## 2024-11-07 RX ORDER — ONDANSETRON 2 MG/ML
4 INJECTION INTRAMUSCULAR; INTRAVENOUS ONCE
Status: COMPLETED | OUTPATIENT
Start: 2024-11-07 | End: 2024-11-07

## 2024-11-07 RX ORDER — SODIUM CHLORIDE 9 MG/ML
10 INJECTION, SOLUTION INTRAMUSCULAR; INTRAVENOUS; SUBCUTANEOUS AS NEEDED
Status: DISCONTINUED | OUTPATIENT
Start: 2024-11-07 | End: 2024-11-07 | Stop reason: HOSPADM

## 2024-11-07 RX ORDER — IOPAMIDOL 612 MG/ML
100 INJECTION, SOLUTION INTRAVASCULAR
Status: COMPLETED | OUTPATIENT
Start: 2024-11-07 | End: 2024-11-07

## 2024-11-07 RX ORDER — ONDANSETRON 4 MG/1
4 TABLET, ORALLY DISINTEGRATING ORAL EVERY 8 HOURS PRN
Qty: 9 TABLET | Refills: 0 | Status: SHIPPED | OUTPATIENT
Start: 2024-11-07 | End: 2024-11-10

## 2024-11-07 RX ORDER — FAMOTIDINE 10 MG/ML
20 INJECTION, SOLUTION INTRAVENOUS ONCE
Status: COMPLETED | OUTPATIENT
Start: 2024-11-07 | End: 2024-11-07

## 2024-11-07 RX ADMIN — FAMOTIDINE 20 MG: 10 INJECTION, SOLUTION INTRAVENOUS at 02:27

## 2024-11-07 RX ADMIN — ONDANSETRON 4 MG: 2 INJECTION INTRAMUSCULAR; INTRAVENOUS at 00:20

## 2024-11-07 RX ADMIN — IBUPROFEN 800 MG: 800 TABLET, FILM COATED ORAL at 02:47

## 2024-11-07 RX ADMIN — IOPAMIDOL 80 ML: 612 INJECTION, SOLUTION INTRAVENOUS at 02:22

## 2024-11-07 RX ADMIN — SODIUM CHLORIDE 1000 ML: 9 INJECTION, SOLUTION INTRAVENOUS at 02:27

## 2024-11-07 NOTE — ED PROVIDER NOTES
EMERGENCY DEPARTMENT ENCOUNTER    Pt Name: Tania Banda  MRN: [id number]  Pt :   1990  Room Number:  1515  Date of encounter:  2024  PCP: Diann Stover MD  ED Provider: JUDY Fields    Historian: Patient    HPI:  Chief Complaint: Nausea, vomiting and abdominal pain    Context: Tania Banda is a 34 y.o. female who presents to the ED c/o nausea, vomiting and abdominal pain. Patient reports that this evening she has what she describes as a fast onset of lower abdominal pain, nausea, vomiting, diarrhea and chills. She reports symptoms have been persistant for the last four hours and that she is dehydrated. Only with surgical history of tubal in March. Reports pain is 4/10 on interview and exam.   HPI     REVIEW OF SYSTEMS  A chief complaint appropriate review of systems was completed and is negative except as noted in the HPI.     PAST MEDICAL HISTORY  Past Medical History:   Diagnosis Date    Anxiety and depression     AVM (arteriovenous malformation) of right kidney     COVID-19 affecting pregnancy in second trimester 10/2023    CTS (carpal tunnel syndrome)     Occurred when pregnant but I have not noticed it in the past 6 months    Fracture, foot ?    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       PAST SURGICAL HISTORY  Past Surgical History:   Procedure Laterality Date    D & C WITH SUCTION  2021    EMBOLIZATION Right     Right kidney AVM    EMBOLIZATION Right     Right kidney AVM    GUM SURGERY  2017    LAPAROSCOPIC TUBAL LIGATION      TONSILLECTOMY  2016    URETERAL STENT INSERTION Right 2009    WISDOM TOOTH EXTRACTION  2007       FAMILY HISTORY  Family History   Problem Relation Age of Onset    Cancer Mother     Cancer Father     Cancer Maternal Grandfather     Cancer Maternal Grandmother     Cancer Paternal Grandmother     Cancer Maternal Aunt         SOCIAL HISTORY  Social History     Socioeconomic History    Marital status:      Spouse name: diane    Number of children: 1    Highest education level: High school graduate   Tobacco Use    Smoking status: Never     Passive exposure: Never    Smokeless tobacco: Never   Vaping Use    Vaping status: Never Used   Substance and Sexual Activity    Alcohol use: Yes     Comment: Less than 1 glass of wine a month    Drug use: Never    Sexual activity: Yes     Partners: Male     Birth control/protection: Tubal ligation       ALLERGIES  Sulfa antibiotics    PHYSICAL EXAM  Physical Exam  GENERAL:   Appears in no acute distress. Non-toxic in appearance.  HENT: Nares patent.  EYES: No scleral icterus.  CV: Regular rhythm, tachycardia.  RESPIRATORY: Normal effort.  No audible wheezes, rales or rhonchi.  ABDOMEN: Soft, diffuse lower abdominal tenderness; No evidence of acute abdomen on physical exam. No rebound or guarding. No peritoneal signs.   MUSCULOSKELETAL: No deformities.   NEURO: Alert, moves all extremities, follows commands.  SKIN: Warm, dry, no rash visualized.  PSYCH: Anxious   I have reviewed the triage vital signs and nursing notes.     LAB RESULTS  Results for orders placed or performed during the hospital encounter of 11/07/24   Comprehensive Metabolic Panel    Collection Time: 11/07/24 12:12 AM    Specimen: Blood   Result Value Ref Range    Glucose 146 (H) 65 - 99 mg/dL    BUN 16 6 - 20 mg/dL    Creatinine 0.82 0.57 - 1.00 mg/dL    Sodium 132 (L) 136 - 145 mmol/L    Potassium 3.3 (L) 3.5 - 5.2 mmol/L    Chloride 96 (L) 98 - 107 mmol/L    CO2 21.0 (L) 22.0 - 29.0 mmol/L    Calcium 9.6 8.6 - 10.5 mg/dL    Total Protein 8.2 6.0 - 8.5 g/dL    Albumin 4.7 3.5 - 5.2 g/dL    ALT (SGPT) 13 1 - 33 U/L    AST (SGOT) 18 1 - 32 U/L    Alkaline Phosphatase 104 39 - 117 U/L    Total Bilirubin 0.6 0.0 - 1.2 mg/dL    Globulin 3.5 gm/dL    A/G Ratio 1.3 g/dL    BUN/Creatinine Ratio 19.5 7.0 - 25.0    Anion Gap 15.0  5.0 - 15.0 mmol/L    eGFR 97.0 >60.0 mL/min/1.73   Lipase    Collection Time: 11/07/24 12:12 AM    Specimen: Blood   Result Value Ref Range    Lipase 24 13 - 60 U/L   hCG, Quantitative, Pregnancy    Collection Time: 11/07/24 12:12 AM    Specimen: Blood   Result Value Ref Range    HCG Quantitative <0.10 mIU/mL   CBC Auto Differential    Collection Time: 11/07/24 12:12 AM    Specimen: Blood   Result Value Ref Range    WBC 18.63 (H) 3.40 - 10.80 10*3/mm3    RBC 4.88 3.77 - 5.28 10*6/mm3    Hemoglobin 14.8 12.0 - 15.9 g/dL    Hematocrit 44.1 34.0 - 46.6 %    MCV 90.4 79.0 - 97.0 fL    MCH 30.3 26.6 - 33.0 pg    MCHC 33.6 31.5 - 35.7 g/dL    RDW 12.6 12.3 - 15.4 %    RDW-SD 41.8 37.0 - 54.0 fl    MPV 10.4 6.0 - 12.0 fL    Platelets 336 140 - 450 10*3/mm3    Neutrophil % 91.4 (H) 42.7 - 76.0 %    Lymphocyte % 2.7 (L) 19.6 - 45.3 %    Monocyte % 5.1 5.0 - 12.0 %    Eosinophil % 0.1 (L) 0.3 - 6.2 %    Basophil % 0.2 0.0 - 1.5 %    Immature Grans % 0.5 0.0 - 0.5 %    Neutrophils, Absolute 17.03 (H) 1.70 - 7.00 10*3/mm3    Lymphocytes, Absolute 0.50 (L) 0.70 - 3.10 10*3/mm3    Monocytes, Absolute 0.95 (H) 0.10 - 0.90 10*3/mm3    Eosinophils, Absolute 0.02 0.00 - 0.40 10*3/mm3    Basophils, Absolute 0.03 0.00 - 0.20 10*3/mm3    Immature Grans, Absolute 0.10 (H) 0.00 - 0.05 10*3/mm3    nRBC 0.0 0.0 - 0.2 /100 WBC   Lactic Acid, Plasma    Collection Time: 11/07/24 12:12 AM    Specimen: Blood   Result Value Ref Range    Lactate 2.0 0.5 - 2.0 mmol/L   Green Top (Gel)    Collection Time: 11/07/24 12:12 AM   Result Value Ref Range    Extra Tube Hold for add-ons.    Lavender Top    Collection Time: 11/07/24 12:12 AM   Result Value Ref Range    Extra Tube hold for add-on    Gold Top - SST    Collection Time: 11/07/24 12:12 AM   Result Value Ref Range    Extra Tube Hold for add-ons.    Gray Top    Collection Time: 11/07/24 12:12 AM   Result Value Ref Range    Extra Tube Hold for add-ons.    Light Blue Top    Collection Time: 11/07/24  12:12 AM   Result Value Ref Range    Extra Tube Hold for add-ons.    Urinalysis With Microscopic If Indicated (No Culture) - Urine, Clean Catch    Collection Time: 11/07/24  1:25 AM    Specimen: Urine, Clean Catch   Result Value Ref Range    Color, UA Yellow Yellow, Straw    Appearance, UA Cloudy (A) Clear    pH, UA 5.5 5.0 - 8.0    Specific Gravity, UA 1.036 (H) 1.001 - 1.030    Glucose, UA Negative Negative    Ketones, UA 80 mg/dL (3+) (A) Negative    Bilirubin, UA Negative Negative    Blood, UA Negative Negative    Protein, UA 30 mg/dL (1+) (A) Negative    Leuk Esterase, UA Small (1+) (A) Negative    Nitrite, UA Negative Negative    Urobilinogen, UA 0.2 E.U./dL 0.2 - 1.0 E.U./dL   Urinalysis, Microscopic Only - Urine, Clean Catch    Collection Time: 11/07/24  1:25 AM    Specimen: Urine, Clean Catch   Result Value Ref Range    RBC, UA 0-2 None Seen, 0-2 /HPF    WBC, UA 6-10 (A) None Seen, 0-2 /HPF    Bacteria, UA None Seen None Seen, Trace /HPF    Squamous Epithelial Cells, UA 21-30 (A) None Seen, 0-2 /HPF    Methodology Manual Light Microscopy        If labs were ordered, I independently reviewed the results and considered them in treating the patient.    RADIOLOGY  CT Abdomen Pelvis With Contrast   Final Result   Impression:   Postprocedural changes from right kidney AVM embolization. No acute abdominal or pelvic abnormality.            Electronically Signed: Juvenal Flanagan MD     11/7/2024 2:30 AM EST     Workstation ID: [id number]        [x] Radiologist's Report Reviewed:  I ordered and independently interpreted the above noted radiographic studies.  See radiologist's dictation for official interpretation.      PROCEDURES    Procedures    No orders to display       MEDICATIONS GIVEN IN ER    Medications   ondansetron (ZOFRAN) injection 4 mg (4 mg Intravenous Given 11/7/24 0020)   sodium chloride 0.9 % bolus 1,000 mL (0 mL Intravenous Stopped 11/7/24 0322)   famotidine (PEPCID) injection 20 mg (20 mg Intravenous  Given 11/7/24 0227)   iopamidol (ISOVUE-300) 61 % injection 100 mL (80 mL Intravenous Given 11/7/24 0222)   ibuprofen (ADVIL,MOTRIN) tablet 800 mg (800 mg Oral Given 11/7/24 0247)       MEDICAL DECISION MAKING, PROGRESS, and CONSULTS   Medical Decision Making  34-year-old nontoxic-appearing female presents the ER for evaluation of abdominal pain, nausea, vomiting and diarrhea for the last 4 hours.  No acute or emergent findings on physical exam.  Labs obtained demonstrated nonspecific elevation in WBC which I suspect is likely reactive to recent episodes of nausea and vomiting.  Remainder labs without acute findings.  CT scan of abdomen pelvis demonstrates postprocedural changes from right kidney AVM embolization but no acute changes in the ER.  Patient provided symptomatic care on the ED and discharged with continued supportive care outpatient, given return precautions with demonstrated understanding.    Problems Addressed:  Gastroenteritis: complicated acute illness or injury  Leukocytosis, unspecified type: complicated acute illness or injury  Nausea and vomiting, unspecified vomiting type: complicated acute illness or injury  Nonspecific abdominal pain: complicated acute illness or injury    Amount and/or Complexity of Data Reviewed  Labs: ordered. Decision-making details documented in ED Course.  Radiology: ordered and independent interpretation performed. Decision-making details documented in ED Course.    Risk  Prescription drug management.        Discussion below represents my analysis of pertinent findings related to patient's condition, differential diagnosis, treatment plan and final disposition.    Differential diagnosis:  The differential diagnosis associated with the patient's presentation includes: Dyspepsia, viral gastroenteritis, constipation, irritable bowel syndrome, abdominal wall pain, gallbladder disease, pancreatitis, diverticulitis, appendicitis, kidney stone, UTI, hernia, gastroparesis, food  poisoning, DKA, hepatitis, bowel obstruction, colitis and others.      Additional sources  Discussed/ obtained information from independent historians:   [] Spouse  [] Parent  [] Family member  [] Friend  [] EMS   [] Other:  External (non-ED) record review:   [] Inpatient record:   [] Office record:   [] Outpatient record:   [] Prior Outpatient labs:   [] Prior Outpatient radiology:   [] Primary Care record:   [] Outside ED record:   [] Other:   Patient's care impacted by:   [] Diabetes  [] Hypertension  [] Hyperlipidemia  [] Hypothyroidism   [] Coronary Artery Disease   [] COPD   [] Cancer   [] Obesity  [] GERD   [] Tobacco Abuse   [] Substance Abuse    [] Anxiety   [] Depression   [x] Other:   Care significantly affected by Social Determinants of Health (housing and economic circumstances, unemployment)    [] Yes     [x] No   If yes, Patient's care significantly limited by  Social Determinants of Health including:   [] Inadequate housing   [] Low income   [] Alcoholism and drug addiction in family   [] Problems related to primary support group   [] Unemployment   [] Problems related to employment   [] Other Social Determinants of Health:     Orders placed during this visit:  Orders Placed This Encounter   Procedures    CT Abdomen Pelvis With Contrast    [place] Draw    Comprehensive Metabolic Panel    Lipase    Urinalysis With Microscopic If Indicated (No Culture) - Urine, Clean Catch    hCG, Quantitative, Pregnancy    CBC Auto Differential    Lactic Acid, Plasma    Urinalysis, Microscopic Only - Urine, Clean Catch    CBC & Differential    Green Top (Gel)    Lavender Top    Gold Top - SST    Gray Top    Light Blue Top   I considered prescription management  with:   [] Pain medication  [] Antiviral  [x] Antibiotic: Clinical presentation and ER workup without evidence of bacterial infection requiring treatment with antibiotics.     [] Other:   Rationale:    ED Course:    ED Course as of 11/07/24 1206   Thu Nov 07,  2024 0235 Initial vitals and Telemetry tracing was reviewed and directly interpreted by myself demonstrating blood pressure 107/89, afebrile, heart rate 126, respirations of 19 breaths/min and oxygen saturation 95% on room air [JG]   0236 BP: 107/89 [JG]   0236 Temp: 97.9 °F (36.6 °C) [JG]   0236 Heart Rate(!): 126 [JG]   0236 Resp: 19 [JG]   0236 SpO2: 95 % [JG]   0236 CT abdomen/pelvis personally interpreted by myself and with official read provided by radiology demonstrates postprocedural changes from right kidney AVM embolization with no acute abdominal or pelvic abnormality. [JG]   0250 On reassessment at bedside patient resting position of comfort no acute distress.  Vital signs stable.  Symptomatic improvement.  Able to tolerate water. [JG]   0317 Labs studies were reviewed and directly interpreted by myself and demonstrated non-specific elevation in WBC; remainder of labs without acute abnormalities and consistent with prior results.  [JG]   0317 WBC(!): 18.63 [JG]      ED Course User Index  [JG] Ru Samuels, PA          DIAGNOSIS  Final diagnoses:   Gastroenteritis   Nausea and vomiting, unspecified vomiting type   Nonspecific abdominal pain   Leukocytosis, unspecified type       DISPOSITION    ED Disposition       ED Disposition   Discharge    Condition   Stable    Comment   --           DISCHARGE    Patient discharged in stable condition.    Reviewed implications of results, diagnosis, meds, responsibility to follow up, warning signs and symptoms of possible worsening, potential complications and reasons to return to ER.    Patient/Family voiced understanding of above instructions.    Discussed plan for discharge, as there is no emergent indication for admission.  Pt/family is agreeable and understands need for follow up and possible repeat testing.  Pt/family is aware that discharge does not mean that nothing is wrong but that it indicates no emergency is currently present that requires admission  and they must continue care with follow-up as given below or with a physician of their choice.     FOLLOW-UP  Diann Stover MD  [address] [facility] [address]  [place] [address]  [phone]    Call   Call for follow up with primary care    [facility] EMERGENCY DEPARTMENT  [address] [facility] [address] [phone]  Go to   If symptoms worsen         Medication List        New Prescriptions      ondansetron ODT 4 MG disintegrating tablet  Commonly known as: ZOFRAN-ODT  Place 1 tablet on the tongue Every 8 (Eight) Hours As Needed for Nausea or Vomiting for up to 3 days.               Where to Get Your Medications        These medications were sent to [facility] [id number] - [place], [place] - [address] [place] [address] AT [facility] - [phone]  - [phone]   [address] [facility] [address], [facility] [address]      Phone: [phone]   ondansetron ODT 4 MG disintegrating tablet          Please note that portions of this document were completed with voice recognition software.        Ru Samuels PA  11/07/24 1123